# Patient Record
Sex: FEMALE | Employment: FULL TIME | URBAN - METROPOLITAN AREA
[De-identification: names, ages, dates, MRNs, and addresses within clinical notes are randomized per-mention and may not be internally consistent; named-entity substitution may affect disease eponyms.]

---

## 2018-12-22 ENCOUNTER — APPOINTMENT (OUTPATIENT)
Dept: CT IMAGING | Age: 43
DRG: 176 | End: 2018-12-22
Attending: EMERGENCY MEDICINE
Payer: COMMERCIAL

## 2018-12-22 ENCOUNTER — HOSPITAL ENCOUNTER (INPATIENT)
Age: 43
LOS: 3 days | Discharge: HOME | DRG: 176 | End: 2018-12-26
Attending: EMERGENCY MEDICINE | Admitting: INTERNAL MEDICINE
Payer: COMMERCIAL

## 2018-12-22 DIAGNOSIS — I26.99 BILATERAL PULMONARY EMBOLISM (HCC): Primary | ICD-10-CM

## 2018-12-22 LAB
APPEARANCE UR: CLEAR
BACTERIA URNS QL MICRO: ABNORMAL /HPF
BASOPHILS # BLD: 0 K/UL (ref 0–0.1)
BASOPHILS NFR BLD: 0 % (ref 0–1)
BILIRUB UR QL: NEGATIVE
COLOR UR: ABNORMAL
DIFFERENTIAL METHOD BLD: ABNORMAL
EOSINOPHIL # BLD: 0 K/UL (ref 0–0.4)
EOSINOPHIL NFR BLD: 0 % (ref 0–7)
EPITH CASTS URNS QL MICRO: ABNORMAL /LPF
ERYTHROCYTE [DISTWIDTH] IN BLOOD BY AUTOMATED COUNT: 14.1 % (ref 11.5–14.5)
FLUAV AG NPH QL IA: NEGATIVE
FLUBV AG NOSE QL IA: NEGATIVE
GLUCOSE UR STRIP.AUTO-MCNC: NEGATIVE MG/DL
HCT VFR BLD AUTO: 38 % (ref 35–47)
HGB BLD-MCNC: 12.9 G/DL (ref 11.5–16)
HGB UR QL STRIP: NEGATIVE
IMM GRANULOCYTES # BLD: 0 K/UL (ref 0–0.04)
IMM GRANULOCYTES NFR BLD AUTO: 0 % (ref 0–0.5)
KETONES UR QL STRIP.AUTO: ABNORMAL MG/DL
LACTATE SERPL-SCNC: 0.5 MMOL/L (ref 0.4–2)
LEUKOCYTE ESTERASE UR QL STRIP.AUTO: NEGATIVE
LYMPHOCYTES # BLD: 1.4 K/UL (ref 0.8–3.5)
LYMPHOCYTES NFR BLD: 7 % (ref 12–49)
MCH RBC QN AUTO: 32.4 PG (ref 26–34)
MCHC RBC AUTO-ENTMCNC: 33.9 G/DL (ref 30–36.5)
MCV RBC AUTO: 95.5 FL (ref 80–99)
MONOCYTES # BLD: 1.6 K/UL (ref 0–1)
MONOCYTES NFR BLD: 8 % (ref 5–13)
NEUTS BAND NFR BLD MANUAL: 1 %
NEUTS SEG # BLD: 17.6 K/UL (ref 1.8–8)
NEUTS SEG NFR BLD: 84 % (ref 32–75)
NITRITE UR QL STRIP.AUTO: NEGATIVE
NRBC # BLD: 0 K/UL (ref 0–0.01)
NRBC BLD-RTO: 0 PER 100 WBC
PH UR STRIP: 6 [PH] (ref 5–8)
PLATELET # BLD AUTO: 213 K/UL (ref 150–400)
PMV BLD AUTO: 11 FL (ref 8.9–12.9)
PROT UR STRIP-MCNC: NEGATIVE MG/DL
RBC # BLD AUTO: 3.98 M/UL (ref 3.8–5.2)
RBC #/AREA URNS HPF: ABNORMAL /HPF (ref 0–5)
RBC MORPH BLD: ABNORMAL
SP GR UR REFRACTOMETRY: 1.02 (ref 1–1.03)
UA: UC IF INDICATED,UAUC: ABNORMAL
UROBILINOGEN UR QL STRIP.AUTO: 1 EU/DL (ref 0.2–1)
WBC # BLD AUTO: 20.6 K/UL (ref 3.6–11)
WBC URNS QL MICRO: ABNORMAL /HPF (ref 0–4)

## 2018-12-22 PROCEDURE — 85025 COMPLETE CBC W/AUTO DIFF WBC: CPT

## 2018-12-22 PROCEDURE — 81001 URINALYSIS AUTO W/SCOPE: CPT

## 2018-12-22 PROCEDURE — 96361 HYDRATE IV INFUSION ADD-ON: CPT

## 2018-12-22 PROCEDURE — 80053 COMPREHEN METABOLIC PANEL: CPT

## 2018-12-22 PROCEDURE — 87804 INFLUENZA ASSAY W/OPTIC: CPT

## 2018-12-22 PROCEDURE — 94762 N-INVAS EAR/PLS OXIMTRY CONT: CPT

## 2018-12-22 PROCEDURE — 71275 CT ANGIOGRAPHY CHEST: CPT

## 2018-12-22 PROCEDURE — 74011250636 HC RX REV CODE- 250/636: Performed by: EMERGENCY MEDICINE

## 2018-12-22 PROCEDURE — 84484 ASSAY OF TROPONIN QUANT: CPT

## 2018-12-22 PROCEDURE — 99285 EMERGENCY DEPT VISIT HI MDM: CPT

## 2018-12-22 PROCEDURE — 36415 COLL VENOUS BLD VENIPUNCTURE: CPT

## 2018-12-22 PROCEDURE — 83605 ASSAY OF LACTIC ACID: CPT

## 2018-12-22 PROCEDURE — 96374 THER/PROPH/DIAG INJ IV PUSH: CPT

## 2018-12-22 PROCEDURE — 87086 URINE CULTURE/COLONY COUNT: CPT

## 2018-12-22 PROCEDURE — 83690 ASSAY OF LIPASE: CPT

## 2018-12-22 PROCEDURE — 87040 BLOOD CULTURE FOR BACTERIA: CPT

## 2018-12-22 PROCEDURE — 84443 ASSAY THYROID STIM HORMONE: CPT

## 2018-12-22 RX ORDER — KETOROLAC TROMETHAMINE 30 MG/ML
15 INJECTION, SOLUTION INTRAMUSCULAR; INTRAVENOUS
Status: COMPLETED | OUTPATIENT
Start: 2018-12-22 | End: 2018-12-22

## 2018-12-22 RX ADMIN — SODIUM CHLORIDE 1000 ML: 900 INJECTION, SOLUTION INTRAVENOUS at 23:11

## 2018-12-22 RX ADMIN — KETOROLAC TROMETHAMINE 15 MG: 30 INJECTION, SOLUTION INTRAMUSCULAR at 23:52

## 2018-12-23 ENCOUNTER — APPOINTMENT (OUTPATIENT)
Dept: ULTRASOUND IMAGING | Age: 43
DRG: 176 | End: 2018-12-23
Attending: INTERNAL MEDICINE
Payer: COMMERCIAL

## 2018-12-23 PROBLEM — I26.99 PE (PULMONARY THROMBOEMBOLISM) (HCC): Status: ACTIVE | Noted: 2018-12-23

## 2018-12-23 LAB
ALBUMIN SERPL-MCNC: 3.2 G/DL (ref 3.5–5)
ALBUMIN/GLOB SERPL: 0.8 {RATIO} (ref 1.1–2.2)
ALP SERPL-CCNC: 89 U/L (ref 45–117)
ALT SERPL-CCNC: 26 U/L (ref 12–78)
ANION GAP SERPL CALC-SCNC: 6 MMOL/L (ref 5–15)
AST SERPL-CCNC: 17 U/L (ref 15–37)
BILIRUB SERPL-MCNC: 0.6 MG/DL (ref 0.2–1)
BUN SERPL-MCNC: 8 MG/DL (ref 6–20)
BUN/CREAT SERPL: 13 (ref 12–20)
CALCIUM SERPL-MCNC: 8.2 MG/DL (ref 8.5–10.1)
CHLORIDE SERPL-SCNC: 105 MMOL/L (ref 97–108)
CO2 SERPL-SCNC: 26 MMOL/L (ref 21–32)
CREAT SERPL-MCNC: 0.63 MG/DL (ref 0.55–1.02)
GLOBULIN SER CALC-MCNC: 4.1 G/DL (ref 2–4)
GLUCOSE SERPL-MCNC: 99 MG/DL (ref 65–100)
HCG UR QL: NEGATIVE
LIPASE SERPL-CCNC: 66 U/L (ref 73–393)
POTASSIUM SERPL-SCNC: 3.7 MMOL/L (ref 3.5–5.1)
PROT SERPL-MCNC: 7.3 G/DL (ref 6.4–8.2)
SODIUM SERPL-SCNC: 137 MMOL/L (ref 136–145)
TROPONIN I SERPL-MCNC: <0.05 NG/ML
TROPONIN I SERPL-MCNC: <0.05 NG/ML
TSH SERPL DL<=0.05 MIU/L-ACNC: 1.02 UIU/ML (ref 0.36–3.74)

## 2018-12-23 PROCEDURE — 65660000000 HC RM CCU STEPDOWN

## 2018-12-23 PROCEDURE — 94640 AIRWAY INHALATION TREATMENT: CPT

## 2018-12-23 PROCEDURE — 94760 N-INVAS EAR/PLS OXIMETRY 1: CPT

## 2018-12-23 PROCEDURE — 74011250636 HC RX REV CODE- 250/636: Performed by: INTERNAL MEDICINE

## 2018-12-23 PROCEDURE — 74011000250 HC RX REV CODE- 250: Performed by: NURSE PRACTITIONER

## 2018-12-23 PROCEDURE — 77010033678 HC OXYGEN DAILY

## 2018-12-23 PROCEDURE — 36415 COLL VENOUS BLD VENIPUNCTURE: CPT

## 2018-12-23 PROCEDURE — 74011636320 HC RX REV CODE- 636/320: Performed by: EMERGENCY MEDICINE

## 2018-12-23 PROCEDURE — 93005 ELECTROCARDIOGRAM TRACING: CPT

## 2018-12-23 PROCEDURE — 77030029684 HC NEB SM VOL KT MONA -A

## 2018-12-23 PROCEDURE — 74011250636 HC RX REV CODE- 250/636: Performed by: EMERGENCY MEDICINE

## 2018-12-23 PROCEDURE — 74011250636 HC RX REV CODE- 250/636: Performed by: NURSE PRACTITIONER

## 2018-12-23 PROCEDURE — 74011250636 HC RX REV CODE- 250/636: Performed by: HOSPITALIST

## 2018-12-23 PROCEDURE — 74011636320 HC RX REV CODE- 636/320

## 2018-12-23 PROCEDURE — 93970 EXTREMITY STUDY: CPT

## 2018-12-23 PROCEDURE — 84145 PROCALCITONIN (PCT): CPT

## 2018-12-23 PROCEDURE — 81025 URINE PREGNANCY TEST: CPT

## 2018-12-23 PROCEDURE — 84484 ASSAY OF TROPONIN QUANT: CPT

## 2018-12-23 PROCEDURE — 74011250637 HC RX REV CODE- 250/637: Performed by: NURSE PRACTITIONER

## 2018-12-23 PROCEDURE — 74011000258 HC RX REV CODE- 258: Performed by: INTERNAL MEDICINE

## 2018-12-23 RX ORDER — DOCUSATE SODIUM 100 MG/1
100 CAPSULE, LIQUID FILLED ORAL
Status: DISCONTINUED | OUTPATIENT
Start: 2018-12-23 | End: 2018-12-26 | Stop reason: HOSPADM

## 2018-12-23 RX ORDER — TRAMADOL HYDROCHLORIDE 50 MG/1
50 TABLET ORAL
Status: DISCONTINUED | OUTPATIENT
Start: 2018-12-23 | End: 2018-12-26 | Stop reason: HOSPADM

## 2018-12-23 RX ORDER — MORPHINE SULFATE 4 MG/ML
1 INJECTION INTRAVENOUS ONCE
Status: COMPLETED | OUTPATIENT
Start: 2018-12-23 | End: 2018-12-23

## 2018-12-23 RX ORDER — SODIUM CHLORIDE 9 MG/ML
50 INJECTION, SOLUTION INTRAVENOUS
Status: COMPLETED | OUTPATIENT
Start: 2018-12-23 | End: 2018-12-23

## 2018-12-23 RX ORDER — GUAIFENESIN 600 MG/1
600 TABLET, EXTENDED RELEASE ORAL 2 TIMES DAILY
Status: DISCONTINUED | OUTPATIENT
Start: 2018-12-23 | End: 2018-12-26 | Stop reason: HOSPADM

## 2018-12-23 RX ORDER — POLYETHYLENE GLYCOL 3350 17 G/17G
17 POWDER, FOR SOLUTION ORAL DAILY
Status: DISCONTINUED | OUTPATIENT
Start: 2018-12-24 | End: 2018-12-26 | Stop reason: HOSPADM

## 2018-12-23 RX ORDER — KETOROLAC TROMETHAMINE 30 MG/ML
15 INJECTION, SOLUTION INTRAMUSCULAR; INTRAVENOUS
Status: COMPLETED | OUTPATIENT
Start: 2018-12-23 | End: 2018-12-24

## 2018-12-23 RX ORDER — FLUTICASONE FUROATE AND VILANTEROL 100; 25 UG/1; UG/1
1 POWDER RESPIRATORY (INHALATION) DAILY
Status: DISCONTINUED | OUTPATIENT
Start: 2018-12-23 | End: 2018-12-26 | Stop reason: HOSPADM

## 2018-12-23 RX ORDER — MORPHINE SULFATE 2 MG/ML
1 INJECTION, SOLUTION INTRAMUSCULAR; INTRAVENOUS ONCE
Status: COMPLETED | OUTPATIENT
Start: 2018-12-23 | End: 2018-12-23

## 2018-12-23 RX ORDER — MORPHINE SULFATE 2 MG/ML
1 INJECTION, SOLUTION INTRAMUSCULAR; INTRAVENOUS
Status: DISCONTINUED | OUTPATIENT
Start: 2018-12-23 | End: 2018-12-24

## 2018-12-23 RX ORDER — FACIAL-BODY WIPES
10 EACH TOPICAL DAILY PRN
Status: DISCONTINUED | OUTPATIENT
Start: 2018-12-23 | End: 2018-12-26 | Stop reason: HOSPADM

## 2018-12-23 RX ORDER — IPRATROPIUM BROMIDE AND ALBUTEROL SULFATE 2.5; .5 MG/3ML; MG/3ML
3 SOLUTION RESPIRATORY (INHALATION)
Status: DISCONTINUED | OUTPATIENT
Start: 2018-12-23 | End: 2018-12-25

## 2018-12-23 RX ORDER — OXYCODONE AND ACETAMINOPHEN 5; 325 MG/1; MG/1
1 TABLET ORAL
Status: DISCONTINUED | OUTPATIENT
Start: 2018-12-23 | End: 2018-12-26 | Stop reason: HOSPADM

## 2018-12-23 RX ORDER — ACETAMINOPHEN 325 MG/1
650 TABLET ORAL
Status: DISCONTINUED | OUTPATIENT
Start: 2018-12-23 | End: 2018-12-26 | Stop reason: HOSPADM

## 2018-12-23 RX ORDER — BENZONATATE 100 MG/1
100 CAPSULE ORAL
Status: DISCONTINUED | OUTPATIENT
Start: 2018-12-23 | End: 2018-12-26 | Stop reason: HOSPADM

## 2018-12-23 RX ORDER — BISACODYL 5 MG
5 TABLET, DELAYED RELEASE (ENTERIC COATED) ORAL DAILY PRN
Status: DISCONTINUED | OUTPATIENT
Start: 2018-12-23 | End: 2018-12-26 | Stop reason: HOSPADM

## 2018-12-23 RX ORDER — ENOXAPARIN SODIUM 100 MG/ML
1 INJECTION SUBCUTANEOUS EVERY 12 HOURS
Status: DISCONTINUED | OUTPATIENT
Start: 2018-12-23 | End: 2018-12-24

## 2018-12-23 RX ORDER — ENOXAPARIN SODIUM 100 MG/ML
1 INJECTION SUBCUTANEOUS
Status: COMPLETED | OUTPATIENT
Start: 2018-12-23 | End: 2018-12-23

## 2018-12-23 RX ORDER — SODIUM CHLORIDE 0.9 % (FLUSH) 0.9 %
10 SYRINGE (ML) INJECTION
Status: COMPLETED | OUTPATIENT
Start: 2018-12-23 | End: 2018-12-23

## 2018-12-23 RX ORDER — LEVOFLOXACIN 5 MG/ML
750 INJECTION, SOLUTION INTRAVENOUS
Status: COMPLETED | OUTPATIENT
Start: 2018-12-23 | End: 2018-12-23

## 2018-12-23 RX ADMIN — MORPHINE SULFATE 1 MG: 2 INJECTION, SOLUTION INTRAMUSCULAR; INTRAVENOUS at 10:47

## 2018-12-23 RX ADMIN — CEFTRIAXONE 1 G: 1 INJECTION, POWDER, FOR SOLUTION INTRAMUSCULAR; INTRAVENOUS at 05:16

## 2018-12-23 RX ADMIN — AZITHROMYCIN MONOHYDRATE 500 MG: 500 INJECTION, POWDER, LYOPHILIZED, FOR SOLUTION INTRAVENOUS at 06:16

## 2018-12-23 RX ADMIN — MORPHINE SULFATE 1 MG: 2 INJECTION, SOLUTION INTRAMUSCULAR; INTRAVENOUS at 17:39

## 2018-12-23 RX ADMIN — IPRATROPIUM BROMIDE AND ALBUTEROL SULFATE 3 ML: .5; 3 SOLUTION RESPIRATORY (INHALATION) at 10:13

## 2018-12-23 RX ADMIN — TRAMADOL HYDROCHLORIDE 50 MG: 50 TABLET, FILM COATED ORAL at 08:29

## 2018-12-23 RX ADMIN — OXYCODONE AND ACETAMINOPHEN 1 TABLET: 5; 325 TABLET ORAL at 09:09

## 2018-12-23 RX ADMIN — MORPHINE SULFATE 1 MG: 2 INJECTION, SOLUTION INTRAMUSCULAR; INTRAVENOUS at 13:45

## 2018-12-23 RX ADMIN — ENOXAPARIN SODIUM 90 MG: 100 INJECTION, SOLUTION INTRAVENOUS; SUBCUTANEOUS at 17:38

## 2018-12-23 RX ADMIN — GUAIFENESIN 600 MG: 600 TABLET, EXTENDED RELEASE ORAL at 13:45

## 2018-12-23 RX ADMIN — FLUTICASONE FUROATE AND VILANTEROL TRIFENATATE 1 PUFF: 100; 25 POWDER RESPIRATORY (INHALATION) at 13:45

## 2018-12-23 RX ADMIN — ENOXAPARIN SODIUM 90 MG: 100 INJECTION, SOLUTION INTRAVENOUS; SUBCUTANEOUS at 03:28

## 2018-12-23 RX ADMIN — IPRATROPIUM BROMIDE AND ALBUTEROL SULFATE 3 ML: .5; 3 SOLUTION RESPIRATORY (INHALATION) at 14:09

## 2018-12-23 RX ADMIN — Medication 10 ML: at 01:00

## 2018-12-23 RX ADMIN — MORPHINE SULFATE 1 MG: 2 INJECTION, SOLUTION INTRAMUSCULAR; INTRAVENOUS at 21:00

## 2018-12-23 RX ADMIN — MORPHINE SULFATE 1 MG: 4 INJECTION INTRAVENOUS at 05:26

## 2018-12-23 RX ADMIN — GUAIFENESIN 600 MG: 600 TABLET, EXTENDED RELEASE ORAL at 17:38

## 2018-12-23 RX ADMIN — OXYCODONE AND ACETAMINOPHEN 1 TABLET: 5; 325 TABLET ORAL at 21:51

## 2018-12-23 RX ADMIN — TRAMADOL HYDROCHLORIDE 50 MG: 50 TABLET, FILM COATED ORAL at 20:19

## 2018-12-23 RX ADMIN — LEVOFLOXACIN 750 MG: 5 INJECTION, SOLUTION INTRAVENOUS at 03:29

## 2018-12-23 RX ADMIN — OXYCODONE AND ACETAMINOPHEN 1 TABLET: 5; 325 TABLET ORAL at 15:35

## 2018-12-23 RX ADMIN — IOPAMIDOL: 755 INJECTION, SOLUTION INTRAVENOUS at 01:40

## 2018-12-23 RX ADMIN — IOPAMIDOL 100 ML: 755 INJECTION, SOLUTION INTRAVENOUS at 01:00

## 2018-12-23 RX ADMIN — SODIUM CHLORIDE 50 ML/HR: 900 INJECTION, SOLUTION INTRAVENOUS at 01:00

## 2018-12-23 RX ADMIN — IPRATROPIUM BROMIDE AND ALBUTEROL SULFATE 3 ML: .5; 3 SOLUTION RESPIRATORY (INHALATION) at 20:03

## 2018-12-23 NOTE — ED PROVIDER NOTES
EMERGENCY DEPARTMENT HISTORY AND PHYSICAL EXAM    Date: 12/22/2018  Patient Name: Tom Jin    History of Presenting Illness     Chief Complaint   Patient presents with    Chest Pain     ambulatory to triage, states that she had been sick for 3 days. Diagnosed with pneumonia yesterday and she started on medications. Was seen in the ED this morning for pains in her chest and right flank area, was prescribed pain meds but not getting any better    Flank Pain       History Provided By: Patient    HPI: Tom Jin is a 39 y.o. female, who presents ambulatory accompanied with her Mother to the ED c/o progressively worsening tight chest and right flank pain x yesterday. Pt states for the past x3 days she has had \"flu like symptoms,\" including intermittent fevers, generalized body aches and a decreased appetite. She states was evaluated yesterday at an ED in [place] where she currently lives, where she was diagnosed with pneumonia after blood gas labs and chest x-ray. Pt states she was placed on azithromycin, but has had no relief. She reports being seen again this morning for persistent pain, noting she had a negative UA and prescribed Tramadol. Pt states her pain has continued, stating it is exacerbated with movement. She also reports mild shortness of breath secondary to her pain. Of note, she had her flu shot x1.5 weeks ago. Pt specifically denies any congestion, cough, leg pain, leg swelling, abdominal pain, nausea, vomiting, diarrhea, dysuria, or urinary frequency. PCP: Angie Reid MD    PMHx: Significant for none  PSHx: Significant for none  Social Hx: -tobacco, -EtOH, -Illicit Drugs     There are no other complaints, changes, or physical findings at this time.      Current Facility-Administered Medications   Medication Dose Route Frequency Provider Last Rate Last Dose    levoFLOXacin (LEVAQUIN) 750 mg in D5W IVPB  750 mg IntraVENous NOW Jonn Alexandra MD        enoxaparin (LOVENOX) injection 90 mg  1 mg/kg SubCUTAneous NOW Toma Cook MD        sodium chloride 0.9 % bolus infusion 1,000 mL  1,000 mL IntraVENous NOW Jed Hoffman MD           Past History     Past Medical History:  No past medical history on file. Past Surgical History:  No past surgical history on file. Family History:  No family history on file. Social History:  Social History     Tobacco Use    Smoking status: Not on file   Substance Use Topics    Alcohol use: Not on file    Drug use: Not on file       Allergies: Allergies   Allergen Reactions    Sulfa (Sulfonamide Antibiotics) Other (comments)     Vomiting           Review of Systems   Review of Systems   Constitutional: Positive for appetite change and fever. Eyes: Negative. Respiratory: Positive for shortness of breath. Cardiovascular: Positive for chest pain. Gastrointestinal: Negative for abdominal pain, nausea and vomiting. Endocrine: Negative. Genitourinary: Positive for flank pain. Negative for difficulty urinating, dysuria and hematuria. Musculoskeletal: Positive for myalgias. Skin: Negative. Allergic/Immunologic: Negative. Neurological: Negative. Psychiatric/Behavioral: Negative for suicidal ideas. All other systems reviewed and are negative.       Physical Exam   Physical Exam      Diagnostic Study Results     Labs -     Recent Results (from the past 12 hour(s))   URINALYSIS W/ REFLEX CULTURE    Collection Time: 12/22/18  9:19 PM   Result Value Ref Range    Color YELLOW/STRAW      Appearance CLEAR CLEAR      Specific gravity 1.024 1.003 - 1.030      pH (UA) 6.0 5.0 - 8.0      Protein NEGATIVE  NEG mg/dL    Glucose NEGATIVE  NEG mg/dL    Ketone TRACE (A) NEG mg/dL    Bilirubin NEGATIVE  NEG      Blood NEGATIVE  NEG      Urobilinogen 1.0 0.2 - 1.0 EU/dL    Nitrites NEGATIVE  NEG      Leukocyte Esterase NEGATIVE  NEG      WBC 0-4 0 - 4 /hpf    RBC 0-5 0 - 5 /hpf    Epithelial cells FEW FEW /lpf    Bacteria 1+ (A) NEG /hpf    UA:UC IF INDICATED URINE CULTURE ORDERED (A) CNI     INFLUENZA A & B AG (RAPID TEST)    Collection Time: 12/22/18 10:43 PM   Result Value Ref Range    Influenza A Antigen NEGATIVE  NEG      Influenza B Antigen NEGATIVE  NEG     LACTIC ACID    Collection Time: 12/22/18 10:47 PM   Result Value Ref Range    Lactic acid 0.5 0.4 - 2.0 MMOL/L   CBC WITH AUTOMATED DIFF    Collection Time: 12/22/18 10:50 PM   Result Value Ref Range    WBC 20.6 (H) 3.6 - 11.0 K/uL    RBC 3.98 3.80 - 5.20 M/uL    HGB 12.9 11.5 - 16.0 g/dL    HCT 38.0 35.0 - 47.0 %    MCV 95.5 80.0 - 99.0 FL    MCH 32.4 26.0 - 34.0 PG    MCHC 33.9 30.0 - 36.5 g/dL    RDW 14.1 11.5 - 14.5 %    PLATELET 603 064 - 775 K/uL    MPV 11.0 8.9 - 12.9 FL    NRBC 0.0 0  WBC    ABSOLUTE NRBC 0.00 0.00 - 0.01 K/uL    NEUTROPHILS 84 (H) 32 - 75 %    BAND NEUTROPHILS 1 %    LYMPHOCYTES 7 (L) 12 - 49 %    MONOCYTES 8 5 - 13 %    EOSINOPHILS 0 0 - 7 %    BASOPHILS 0 0 - 1 %    IMMATURE GRANULOCYTES 0 0.0 - 0.5 %    ABS. NEUTROPHILS 17.6 (H) 1.8 - 8.0 K/UL    ABS. LYMPHOCYTES 1.4 0.8 - 3.5 K/UL    ABS. MONOCYTES 1.6 (H) 0.0 - 1.0 K/UL    ABS. EOSINOPHILS 0.0 0.0 - 0.4 K/UL    ABS. BASOPHILS 0.0 0.0 - 0.1 K/UL    ABS. IMM. GRANS. 0.0 0.00 - 0.04 K/UL    DF MANUAL      RBC COMMENTS NORMOCYTIC, NORMOCHROMIC     METABOLIC PANEL, COMPREHENSIVE    Collection Time: 12/22/18 10:50 PM   Result Value Ref Range    Sodium 137 136 - 145 mmol/L    Potassium 3.7 3.5 - 5.1 mmol/L    Chloride 105 97 - 108 mmol/L    CO2 26 21 - 32 mmol/L    Anion gap 6 5 - 15 mmol/L    Glucose 99 65 - 100 mg/dL    BUN 8 6 - 20 MG/DL    Creatinine 0.63 0.55 - 1.02 MG/DL    BUN/Creatinine ratio 13 12 - 20      GFR est AA >60 >60 ml/min/1.73m2    GFR est non-AA >60 >60 ml/min/1.73m2    Calcium 8.2 (L) 8.5 - 10.1 MG/DL    Bilirubin, total 0.6 0.2 - 1.0 MG/DL    ALT (SGPT) 26 12 - 78 U/L    AST (SGOT) 17 15 - 37 U/L    Alk.  phosphatase 89 45 - 117 U/L    Protein, total 7.3 6.4 - 8.2 g/dL    Albumin 3.2 (L) 3.5 - 5.0 g/dL    Globulin 4.1 (H) 2.0 - 4.0 g/dL    A-G Ratio 0.8 (L) 1.1 - 2.2     TROPONIN I    Collection Time: 12/22/18 10:50 PM   Result Value Ref Range    Troponin-I, Qt. <0.05 <0.05 ng/mL   LIPASE    Collection Time: 12/22/18 10:50 PM   Result Value Ref Range    Lipase 66 (L) 73 - 393 U/L   TSH 3RD GENERATION    Collection Time: 12/22/18 10:50 PM   Result Value Ref Range    TSH 1.02 0.36 - 3.74 uIU/mL   HCG URINE, QL. - POC    Collection Time: 12/23/18 12:46 AM   Result Value Ref Range    Pregnancy test,urine (POC) NEGATIVE  NEG         Radiologic Studies -   CTA CHEST W OR W WO CONT   Final Result   IMPRESSION:   1. Bilateral pulmonary emboli. 2. Right lower lobe consolidation with right pleural effusion. 3. Minimal airspace disease left lower lobe. CT Results  (Last 48 hours)               12/23/18 0200  CTA CHEST W OR W WO CONT Final result    Impression:  IMPRESSION:   1. Bilateral pulmonary emboli. 2. Right lower lobe consolidation with right pleural effusion. 3. Minimal airspace disease left lower lobe. Narrative:  History: Chest pain. CTA of the chest was performed. 100 mL Isovue-370 were injected and scanning   was performed during the arterial phase of contrast administration. Post   processing was performed. 3D reconstructions were performed. CT dose reduction   was achieved through use of a standardized protocol tailored for this   examination and automatic exposure control for dose modulation. There are filling defects within pulmonary arteries in the left lower lobe,   right lower lobe, right upper lobe, and right middle lobe. There is a right   pleural effusion. There is minimal airspace disease in the left lower lobe and   there is dense consolidation in the right lower lobe. The heart, pericardium,   and great vessels appear unremarkable. The chest wall and axilla appear   unremarkable.                 CXR Results  (Last 48 hours)    None Medical Decision Making   I am the first provider for this patient. I reviewed the vital signs, available nursing notes, past medical history, past surgical history, family history and social history. Vital Signs-Reviewed the patient's vital signs. Patient Vitals for the past 12 hrs:   Temp Pulse Resp BP SpO2   12/23/18 0215 -- 79 18 120/70 93 %   12/23/18 0200 -- 83 16 125/66 93 %   12/23/18 0152 -- -- -- 131/67 --   12/23/18 0030 -- 84 21 119/70 94 %   12/23/18 0015 -- 87 17 121/74 94 %   12/23/18 0001 -- 86 27 134/77 94 %   12/22/18 2345 -- 89 18 124/73 94 %   12/22/18 2330 -- 87 19 121/75 93 %   12/22/18 2315 -- 91 21 131/76 93 %   12/22/18 2300 -- 90 21 130/76 93 %   12/22/18 2245 -- 93 19 119/71 94 %   12/22/18 2233 -- 98 19 134/79 93 %   12/22/18 2224 -- -- -- 133/71 95 %   12/22/18 2105 98.5 °F (36.9 °C) (!) 109 24 (!) 134/104 100 %       Pulse Oximetry Analysis - 95% on RA    Cardiac Monitor:   Rate: 90 bpm  Rhythm: Normal Sinus Rhythm      Records Reviewed: Nursing Notes and Old Medical Records    Provider Notes (Medical Decision Making):     DDX:  Pna, pe, pleural effusion, gall bladder disease, uti, stone, pyelo    Plan:  Labs, ua, cta, analgesic    Impression:  Bilateral PE's    ED Course:   Initial assessment performed. The patients presenting problems have been discussed, and they are in agreement with the care plan formulated and outlined with them. I have encouraged them to ask questions as they arise throughout their visit. I reviewed our electronic medical record system for any past medical records that were available that may contribute to the patients current condition, the nursing notes and and vital signs from today's visit    Nursing notes will be reviewed as they become available in realtime while the pt has been in the ED. Belle Casillas MD    I personally reviewed pt's imaging. Official read by radiology listed above.   Belle Casillas MD         PROGRESS NOTE:  2:25 AM  Pt reevaluated. Pt noted to have bilateral PEs on chest cta. Will dose with Lovenox and start iv Levaquin. Written by Chante Troncoso, ED Scribe, as dictated by Abisai Andres MD    PROGRESS NOTE  2:30 AM  Pt reevaluated. Pt updated on resulted chest cta and current plan of care, including admission. Pt expressed understanding of current plan. Written by Chante Troncoso, ED Scribe, as dictated by Abisai Andres MD.     CONSULT NOTE:   2:31 AM  Abisai Andres MD spoke with Dr. Cayla Mclean,   Specialty: Sandy Mclean due to bilateral pe on chest cta. Discussed pt's HPI and available diagnostic results thus far. Expressed concerns for needed admission. Consultant will admit. Abisai Andres MD      Critical Care Time:     none    PLAN:  1. Admission to the hospitalist     Disposition:    Admit Note:  2:33 AM  Pt is being admitted by Dr. Cayla Mclean. The results of their tests and reason(s) for their admission have been discussed with pt and/or available family. They convey agreement and understanding for the need to be admitted and for admission diagnosis. Diagnosis     Clinical Impression:   1. Bilateral pulmonary embolism ([address]        Attestations: This note is prepared by Chante Troncoso, acting as Scribe for MD Abisai Olsen MD : The scribe's documentation has been prepared under my direction and personally reviewed by me in its entirety. I confirm that the note above accurately reflects all work, treatment, procedures, and medical decision making performed by me. This note will not be viewable in [address]

## 2018-12-23 NOTE — PROGRESS NOTES
TRANSFER - IN REPORT:    Verbal report received from [place] [id number] [facility] on [place]  being received from ER for routine progression of care      Report consisted of patients Situation, Background, Assessment and   Recommendations(SBAR). Information from the following report(s) SBAR was reviewed with the receiving nurse. Opportunity for questions and clarification was provided. Assessment completed upon patients arrival to unit and care assumed.      Primary Nurse Piyush Room, RN and Maribel Jaquez RN performed a dual skin assessment on this patient No impairment noted  Rickie score is 23

## 2018-12-23 NOTE — PROGRESS NOTES
Pharmacy - Enoxaparin (Lovenox®) Monitoring      Indication: PE     Current Dose: Enoxaparin 90 mg subcutaneously every 12 hours    Creatinine Clearance (mL/min): >30    Current Weight: 86 Kg    Labs:  Recent Labs     12/22/18  2250   CREA 0.63   HGB 12.9        Wt Readings from Last 1 Encounters:   12/22/18 86.4 kg (190 lb 7.6 oz)     Ht Readings from Last 1 Encounters:   12/22/18 167.6 cm (66\")       Impression/Plan:   · Enoxaparin 90 mg BID is appropriate dose        ThanksJerry, PHARMD      [email or web address]%[email or web address]. pdf

## 2018-12-23 NOTE — PROGRESS NOTES
Hospitalist Progress Note    NAME: Darek Dunham   :  1975   MRN:  [id number]       Interim Hospital Summary: 39 y.o. female whom presented on 2018 with      Assessment / Plan:  Sepsis (tachycardia, leukocytosis, SOB, and chest pain) POA  Bilateral acute PE POA  CAP POA  Tobacco abuse (20+ yrs of smoking hx)  - continue care at telemetry unit  - CTA chest:     1. Bilateral pulmonary emboli. 2. Right lower lobe consolidation with right pleural effusion. 3. Minimal airspace disease left lower lobe. - troponin (-) x 2, no chest pain at this time  - do not see any 12 lead Echocardiogram in the system. Telemetry revealed NSR at this time. We will get 12 lead EKG to make sure there's no abnormalities  - Venous Doppler lower extremities; to be done  - currently on therapeutic dose of Lovenox; asked CM to check on price for Eliquis  - dx with PNA about one week ago, just finished one week course of ABX. Continue with azithromycin and rocephin  - U/A (-)  - started on Breo, mucinex, and scheduled duoneb  - discussed about importance of smoking cessation which pt agreed. Nicotine patch for now  - procal level sent by admitting physician  - blood and urine cx pending  - sputum cx if she starts to have productive cough  - pulmonary toilet; incentive spirometry. Wean O2 as long as O2 sat is greater than or equal to 93%  - pain medication PRN  - Echo to be done  - hematology consult; only risk factor smoking. Last used hormonal therapy about 15 yrs ago    Obese  - pt aware importance of weight loss through life style modification     Code Status: full code   Surrogate Decision Maker:mother      DVT Prophylaxis: Lovenox   GI Prophylaxis: not indicated     Baseline: independent     Disposition: home with family    Subjective:     Chief Complaint / Reason for Physician Visit  \"my chest hurts when I cough or move\". Discussed with RN events overnight.      Review of Systems:  Symptom Y/N Comments Symptom Y/N Comments   Fever/Chills n   Chest Pain n    Poor Appetite    Edema     Cough y   Abdominal Pain     Sputum n   Joint Pain     SOB/LARSEN y   Pruritis/Rash     Nausea/vomit n   Tolerating PT/OT     Diarrhea n   Tolerating Diet     Constipation n   Other       Could NOT obtain due to:      Objective:     VITALS:   Last 24hrs VS reviewed since prior progress note. Most recent are:  Patient Vitals for the past 24 hrs:   Temp Pulse Resp BP SpO2   12/23/18 1046 -- -- -- -- 94 %   12/23/18 1014 -- -- -- -- (!) 89 %   12/23/18 0722 98.6 °F (37 °C) 94 18 125/75 93 %   12/23/18 0448 98.3 °F (36.8 °C) 78 18 130/84 100 %   12/23/18 0402 98.1 °F (36.7 °C) 84 16 133/72 94 %   12/23/18 0401 98.1 °F (36.7 °C) -- -- -- --   12/23/18 0215 -- 79 18 120/70 93 %   12/23/18 0200 -- 83 16 125/66 93 %   12/23/18 0152 -- -- -- 131/67 --   12/23/18 0030 -- 84 21 119/70 94 %   12/23/18 0015 -- 87 17 121/74 94 %   12/23/18 0001 -- 86 27 134/77 94 %   12/22/18 2345 -- 89 18 124/73 94 %   12/22/18 2330 -- 87 19 121/75 93 %   12/22/18 2315 -- 91 21 131/76 93 %   12/22/18 2300 -- 90 21 130/76 93 %   12/22/18 2245 -- 93 19 119/71 94 %   12/22/18 2233 -- 98 19 134/79 93 %   12/22/18 2224 -- -- -- 133/71 95 %   12/22/18 2105 98.5 °F (36.9 °C) (!) 109 24 (!) 134/104 100 %       Intake/Output Summary (Last 24 hours) at 12/23/2018 1115  Last data filed at 12/23/2018 0900  Gross per 24 hour   Intake 140 ml   Output --   Net 140 ml        PHYSICAL EXAM:  General: WD, WN. Alert, cooperative, no acute distress    EENT:  EOMI. Anicteric sclerae. MMM  Resp:  Clear in apex with diminished breath sounds at bases, no wheezing or rales. No accessory muscle use  CV:  Regular  rhythm,  No edema  GI:  Soft, Non distended, Non tender.  +Bowel sounds  Neurologic:  Alert and oriented X 3, normal speech,   Psych:   Good insight. Not anxious nor agitated  Skin:  No rashes.   No jaundice    Reviewed most current lab test results and cultures  YES  Reviewed most current radiology test results   YES  Review and summation of old records today    NO  Reviewed patient's current orders and MAR    YES  PMH/SH reviewed - no change compared to H&P  ________________________________________________________________________  Care Plan discussed with:    Comments   Patient y    Family      RN y    Care Manager     Consultant                        Multidiciplinary team rounds were held today with , nursing, pharmacist and clinical coordinator. Patient's plan of care was discussed; medications were reviewed and discharge planning was addressed. ________________________________________________________________________  Total NON critical care TIME:  30  Minutes    Total CRITICAL CARE TIME Spent:   Minutes non procedure based      Comments   >50% of visit spent in counseling and coordination of care     ________________________________________________________________________  Roxana Farrell NP     Procedures: see electronic medical records for all procedures/Xrays and details which were not copied into this note but were reviewed prior to creation of Plan. LABS:  I reviewed today's most current labs and imaging studies.   Pertinent labs include:  Recent Labs     12/22/18  2250   WBC 20.6*   HGB 12.9   HCT 38.0        Recent Labs     12/22/18  2250      K 3.7      CO2 26   GLU 99   BUN 8   CREA 0.63   CA 8.2*   ALB 3.2*   TBILI 0.6   SGOT 17   ALT 26       Signed: )Silvino Ceron NP

## 2018-12-23 NOTE — ED NOTES
TRANSFER - OUT REPORT:    Verbal report given to RN(name) on [address]  being transferred to [facility](unit) for routine progression of care       Report consisted of patients Situation, Background, Assessment and   Recommendations(SBAR). Information from the following report(s) SBAR and ED Summary was reviewed with the receiving nurse. Lines:   Peripheral IV 12/22/18 Left Antecubital (Active)   Site Assessment Clean, dry, & intact 12/22/2018 10:44 PM   Phlebitis Assessment 0 12/22/2018 10:44 PM   Infiltration Assessment 0 12/22/2018 10:44 PM   Dressing Status Clean, dry, & intact 12/22/2018 10:44 PM   Dressing Type Transparent 12/22/2018 10:44 PM        Opportunity for questions and clarification was provided.       Patient transported with:   [employer]

## 2018-12-23 NOTE — H&P
Hospitalist Admission Note    NAME: Natalia Yepez   :  1975   MRN:  [id number]     Date/Time:  2018 6:15 AM    Patient PCP: Angie Reid MD  ______________________________________________________________________  Given the patient's current clinical presentation, I have a high level of concern for decompensation if discharged from the emergency department. Complex decision making was performed, which includes reviewing the patient's available past medical records, laboratory results, and x-ray films. My assessment of this patient's clinical condition and my plan of care is as follows. Assessment / Plan:    Bilateral acute PE  -Admit patient to telemetry unit to-start patient on full dose Lovenox  -Follow-up serial troponin  -Echocardiogram  -Venous Doppler lower    Sepsis secondary to community-acquired pneumonia  -Follow-up pro calcitonin level  -Follow-up blood culture and sputum culture  -Start patient on broad-spectrum antibiotic Rocephin and azithromycin      Code Status: full code   Surrogate Decision Maker:mother     DVT Prophylaxis: Lovenox   GI Prophylaxis: not indicated    Baseline: independent       Subjective:   CHIEF COMPLAINT: chest pain   39years old female from home with no significant past medical history presented to the hospital complaining from chest pain associated with the flank pain started yesterday associated with shortness of breath and generalized weakness, patient stated she was diagnosed with pneumonia about 1 week ago and she was discharged with oral antibiotic, according to the patient her symptom did not improve and her condition worsening, patient had CTA chest was done and show bilateral PE and right lower lobe consolidation with right pleural effusion and minimal airspace disease in the lower lobe, blood work was significant for elevated white blood cell count 20.6.   HISTORY OF PRESENT ILLNESS:       We were asked to admit for work up and evaluation of the above problems. No past medical history on file. No past surgical history on file. Social History     Tobacco Use    Smoking status: Not on file   Substance Use Topics    Alcohol use: Not on file        No family history on file. Allergies   Allergen Reactions    Sulfa (Sulfonamide Antibiotics) Other (comments)     Vomiting          Prior to Admission medications    Not on File       REVIEW OF SYSTEMS:     I am not able to complete the review of systems because:    The patient is intubated and sedated    The patient has altered mental status due to his acute medical problems    The patient has baseline aphasia from prior stroke(s)    The patient has baseline dementia and is not reliable historian    The patient is in acute medical distress and unable to provide information           Total of 12 systems reviewed as follows:       POSITIVE= underlined text  Negative = text not underlined  General:  fever, chills, sweats, generalized weakness, weight loss/gain,      loss of appetite   Eyes:    blurred vision, eye pain, loss of vision, double vision  ENT:    rhinorrhea, pharyngitis   Respiratory:   cough, sputum production, SOB, LARSEN, wheezing, pleuritic pain   Cardiology:   chest pain, palpitations, orthopnea, PND, edema, syncope   Gastrointestinal:  abdominal pain , N/V, diarrhea, dysphagia, constipation, bleeding   Genitourinary:  frequency, urgency, dysuria, hematuria, incontinence   Muskuloskeletal :  arthralgia, myalgia, back pain  Hematology:  easy bruising, nose or gum bleeding, lymphadenopathy   Dermatological: rash, ulceration, pruritis, color change / jaundice  Endocrine:   hot flashes or polydipsia   Neurological:  headache, dizziness, confusion, focal weakness, paresthesia,     Speech difficulties, memory loss, gait difficulty  Psychological: Feelings of anxiety, depression, agitation    Objective:   VITALS:    Visit Vitals  /84   Pulse 78   Temp 98.3 °F (36.8 °C)   Resp 18   Ht 5' 6\" (1.676 m)   Wt 86.4 kg (190 lb 7.6 oz)   SpO2 100%   Breastfeeding? No   BMI 30.74 kg/m²       PHYSICAL EXAM:    General:    Alert, cooperative, no distress, appears stated age. HEENT: Atraumatic, anicteric sclerae, pink conjunctivae     No oral ulcers, mucosa moist, throat clear, dentition fair  Neck:  Supple, symmetrical,  thyroid: non tender  Lungs:   Clear to auscultation bilaterally. No Wheezing or Rhonchi. No rales. Chest wall:  No tenderness  No Accessory muscle use. Heart:   Regular  rhythm,  No  murmur   No edema  Abdomen:   Soft, non-tender. Not distended. Bowel sounds normal  Extremities: No cyanosis. No clubbing,      Skin turgor normal, Capillary refill normal, Radial dial pulse 2+  Skin:     Not pale. Not Jaundiced  No rashes   Psych:  Good insight. Not depressed. Not anxious or agitated. Neurologic: EOMs intact. No facial asymmetry. No aphasia or slurred speech. Symmetrical strength, Sensation grossly intact. Alert and oriented X 4.     _______________________________________________________________________  Care Plan discussed with:    Comments   Patient y    Family  y    RN     Care Manager                    Consultant:      _______________________________________________________________________  Expected  Disposition:   Home with Family y   HH/PT/OT/RN    SNF/LTC    BON    ________________________________________________________________________  TOTAL TIME:  72  Minutes    Critical Care Provided     Minutes non procedure based      Comments    y Reviewed previous records   >50% of visit spent in counseling and coordination of care y Discussion with patient and/or family and questions answered       ________________________________________________________________________  Signed: Garfield Crane MD    Procedures: see electronic medical records for all procedures/Xrays and details which were not copied into this note but were reviewed prior to creation of Plan.     LAB DATA REVIEWED:    Recent Results (from the past 24 hour(s))   URINALYSIS W/ REFLEX CULTURE    Collection Time: 12/22/18  9:19 PM   Result Value Ref Range    Color YELLOW/STRAW      Appearance CLEAR CLEAR      Specific gravity 1.024 1.003 - 1.030      pH (UA) 6.0 5.0 - 8.0      Protein NEGATIVE  NEG mg/dL    Glucose NEGATIVE  NEG mg/dL    Ketone TRACE (A) NEG mg/dL    Bilirubin NEGATIVE  NEG      Blood NEGATIVE  NEG      Urobilinogen 1.0 0.2 - 1.0 EU/dL    Nitrites NEGATIVE  NEG      Leukocyte Esterase NEGATIVE  NEG      WBC 0-4 0 - 4 /hpf    RBC 0-5 0 - 5 /hpf    Epithelial cells FEW FEW /lpf    Bacteria 1+ (A) NEG /hpf    UA:UC IF INDICATED URINE CULTURE ORDERED (A) CNI     INFLUENZA A & B AG (RAPID TEST)    Collection Time: 12/22/18 10:43 PM   Result Value Ref Range    Influenza A Antigen NEGATIVE  NEG      Influenza B Antigen NEGATIVE  NEG     LACTIC ACID    Collection Time: 12/22/18 10:47 PM   Result Value Ref Range    Lactic acid 0.5 0.4 - 2.0 MMOL/L   CBC WITH AUTOMATED DIFF    Collection Time: 12/22/18 10:50 PM   Result Value Ref Range    WBC 20.6 (H) 3.6 - 11.0 K/uL    RBC 3.98 3.80 - 5.20 M/uL    HGB 12.9 11.5 - 16.0 g/dL    HCT 38.0 35.0 - 47.0 %    MCV 95.5 80.0 - 99.0 FL    MCH 32.4 26.0 - 34.0 PG    MCHC 33.9 30.0 - 36.5 g/dL    RDW 14.1 11.5 - 14.5 %    PLATELET 663 279 - 016 K/uL    MPV 11.0 8.9 - 12.9 FL    NRBC 0.0 0  WBC    ABSOLUTE NRBC 0.00 0.00 - 0.01 K/uL    NEUTROPHILS 84 (H) 32 - 75 %    BAND NEUTROPHILS 1 %    LYMPHOCYTES 7 (L) 12 - 49 %    MONOCYTES 8 5 - 13 %    EOSINOPHILS 0 0 - 7 %    BASOPHILS 0 0 - 1 %    IMMATURE GRANULOCYTES 0 0.0 - 0.5 %    ABS. NEUTROPHILS 17.6 (H) 1.8 - 8.0 K/UL    ABS. LYMPHOCYTES 1.4 0.8 - 3.5 K/UL    ABS. MONOCYTES 1.6 (H) 0.0 - 1.0 K/UL    ABS. EOSINOPHILS 0.0 0.0 - 0.4 K/UL    ABS. BASOPHILS 0.0 0.0 - 0.1 K/UL    ABS. IMM.  GRANS. 0.0 0.00 - 0.04 K/UL    DF MANUAL      RBC COMMENTS NORMOCYTIC, NORMOCHROMIC     METABOLIC PANEL, COMPREHENSIVE Collection Time: 12/22/18 10:50 PM   Result Value Ref Range    Sodium 137 136 - 145 mmol/L    Potassium 3.7 3.5 - 5.1 mmol/L    Chloride 105 97 - 108 mmol/L    CO2 26 21 - 32 mmol/L    Anion gap 6 5 - 15 mmol/L    Glucose 99 65 - 100 mg/dL    BUN 8 6 - 20 MG/DL    Creatinine 0.63 0.55 - 1.02 MG/DL    BUN/Creatinine ratio 13 12 - 20      GFR est AA >60 >60 ml/min/1.73m2    GFR est non-AA >60 >60 ml/min/1.73m2    Calcium 8.2 (L) 8.5 - 10.1 MG/DL    Bilirubin, total 0.6 0.2 - 1.0 MG/DL    ALT (SGPT) 26 12 - 78 U/L    AST (SGOT) 17 15 - 37 U/L    Alk.  phosphatase 89 45 - 117 U/L    Protein, total 7.3 6.4 - 8.2 g/dL    Albumin 3.2 (L) 3.5 - 5.0 g/dL    Globulin 4.1 (H) 2.0 - 4.0 g/dL    A-G Ratio 0.8 (L) 1.1 - 2.2     TROPONIN I    Collection Time: 12/22/18 10:50 PM   Result Value Ref Range    Troponin-I, Qt. <0.05 <0.05 ng/mL   LIPASE    Collection Time: 12/22/18 10:50 PM   Result Value Ref Range    Lipase 66 (L) 73 - 393 U/L   TSH 3RD GENERATION    Collection Time: 12/22/18 10:50 PM   Result Value Ref Range    TSH 1.02 0.36 - 3.74 uIU/mL   HCG URINE, QL. - POC    Collection Time: 12/23/18 12:46 AM   Result Value Ref Range    Pregnancy test,urine (POC) NEGATIVE  NEG     TROPONIN I    Collection Time: 12/23/18  3:50 AM   Result Value Ref Range    Troponin-I, Qt. <0.05 <0.05 ng/mL

## 2018-12-24 LAB
ANION GAP SERPL CALC-SCNC: 7 MMOL/L (ref 5–15)
ATRIAL RATE: 80 BPM
BACTERIA SPEC CULT: NORMAL
BASOPHILS # BLD: 0.1 K/UL (ref 0–0.1)
BASOPHILS NFR BLD: 0 % (ref 0–1)
BUN SERPL-MCNC: 14 MG/DL (ref 6–20)
BUN/CREAT SERPL: 23 (ref 12–20)
CALCIUM SERPL-MCNC: 8.5 MG/DL (ref 8.5–10.1)
CALCULATED R AXIS, ECG10: 62 DEGREES
CALCULATED T AXIS, ECG11: 27 DEGREES
CC UR VC: NORMAL
CHLORIDE SERPL-SCNC: 106 MMOL/L (ref 97–108)
CO2 SERPL-SCNC: 24 MMOL/L (ref 21–32)
CREAT SERPL-MCNC: 0.61 MG/DL (ref 0.55–1.02)
DIAGNOSIS, 93000: NORMAL
DIFFERENTIAL METHOD BLD: ABNORMAL
EOSINOPHIL # BLD: 0.1 K/UL (ref 0–0.4)
EOSINOPHIL NFR BLD: 1 % (ref 0–7)
ERYTHROCYTE [DISTWIDTH] IN BLOOD BY AUTOMATED COUNT: 14.3 % (ref 11.5–14.5)
GLUCOSE SERPL-MCNC: 87 MG/DL (ref 65–100)
HCT VFR BLD AUTO: 35.4 % (ref 35–47)
HCYS SERPL-SCNC: 3.5 UMOL/L (ref 3.7–13.9)
HGB BLD-MCNC: 11.6 G/DL (ref 11.5–16)
IMM GRANULOCYTES # BLD: 0.1 K/UL (ref 0–0.04)
IMM GRANULOCYTES NFR BLD AUTO: 1 % (ref 0–0.5)
LYMPHOCYTES # BLD: 2.3 K/UL (ref 0.8–3.5)
LYMPHOCYTES NFR BLD: 13 % (ref 12–49)
MCH RBC QN AUTO: 32.4 PG (ref 26–34)
MCHC RBC AUTO-ENTMCNC: 32.8 G/DL (ref 30–36.5)
MCV RBC AUTO: 98.9 FL (ref 80–99)
MONOCYTES # BLD: 1.8 K/UL (ref 0–1)
MONOCYTES NFR BLD: 11 % (ref 5–13)
NEUTS SEG # BLD: 13 K/UL (ref 1.8–8)
NEUTS SEG NFR BLD: 75 % (ref 32–75)
NRBC # BLD: 0 K/UL (ref 0–0.01)
NRBC BLD-RTO: 0 PER 100 WBC
P-R INTERVAL, ECG05: 138 MS
PLATELET # BLD AUTO: 227 K/UL (ref 150–400)
PMV BLD AUTO: 11.7 FL (ref 8.9–12.9)
POTASSIUM SERPL-SCNC: 3.7 MMOL/L (ref 3.5–5.1)
PROCALCITONIN SERPL-MCNC: 0.05 NG/ML (ref 0–0.08)
Q-T INTERVAL, ECG07: 368 MS
QRS DURATION, ECG06: 88 MS
QTC CALCULATION (BEZET), ECG08: 424 MS
RBC # BLD AUTO: 3.58 M/UL (ref 3.8–5.2)
SERVICE CMNT-IMP: NORMAL
SODIUM SERPL-SCNC: 137 MMOL/L (ref 136–145)
VENTRICULAR RATE, ECG03: 80 BPM
WBC # BLD AUTO: 17.3 K/UL (ref 3.6–11)

## 2018-12-24 PROCEDURE — 74011250637 HC RX REV CODE- 250/637: Performed by: NURSE PRACTITIONER

## 2018-12-24 PROCEDURE — 74011250637 HC RX REV CODE- 250/637: Performed by: INTERNAL MEDICINE

## 2018-12-24 PROCEDURE — 77010033678 HC OXYGEN DAILY

## 2018-12-24 PROCEDURE — 36415 COLL VENOUS BLD VENIPUNCTURE: CPT

## 2018-12-24 PROCEDURE — 81241 F5 GENE: CPT

## 2018-12-24 PROCEDURE — 74011000250 HC RX REV CODE- 250: Performed by: INTERNAL MEDICINE

## 2018-12-24 PROCEDURE — 85303 CLOT INHIBIT PROT C ACTIVITY: CPT

## 2018-12-24 PROCEDURE — 85732 THROMBOPLASTIN TIME PARTIAL: CPT

## 2018-12-24 PROCEDURE — 81240 F2 GENE: CPT

## 2018-12-24 PROCEDURE — 85306 CLOT INHIBIT PROT S FREE: CPT

## 2018-12-24 PROCEDURE — 80048 BASIC METABOLIC PNL TOTAL CA: CPT

## 2018-12-24 PROCEDURE — 74011000250 HC RX REV CODE- 250: Performed by: NURSE PRACTITIONER

## 2018-12-24 PROCEDURE — 74011000258 HC RX REV CODE- 258: Performed by: INTERNAL MEDICINE

## 2018-12-24 PROCEDURE — 85025 COMPLETE CBC W/AUTO DIFF WBC: CPT

## 2018-12-24 PROCEDURE — 85598 HEXAGNAL PHOSPH PLTLT NEUTRL: CPT

## 2018-12-24 PROCEDURE — 94640 AIRWAY INHALATION TREATMENT: CPT

## 2018-12-24 PROCEDURE — 74011250636 HC RX REV CODE- 250/636: Performed by: INTERNAL MEDICINE

## 2018-12-24 PROCEDURE — 74011250636 HC RX REV CODE- 250/636: Performed by: NURSE PRACTITIONER

## 2018-12-24 PROCEDURE — 83090 ASSAY OF HOMOCYSTEINE: CPT

## 2018-12-24 PROCEDURE — 85613 RUSSELL VIPER VENOM DILUTED: CPT

## 2018-12-24 PROCEDURE — 86147 CARDIOLIPIN ANTIBODY EA IG: CPT

## 2018-12-24 PROCEDURE — 85300 ANTITHROMBIN III ACTIVITY: CPT

## 2018-12-24 PROCEDURE — 74011250636 HC RX REV CODE- 250/636: Performed by: HOSPITALIST

## 2018-12-24 PROCEDURE — 94760 N-INVAS EAR/PLS OXIMETRY 1: CPT

## 2018-12-24 PROCEDURE — 65660000000 HC RM CCU STEPDOWN

## 2018-12-24 RX ORDER — LIDOCAINE 4 G/100G
1 PATCH TOPICAL EVERY 24 HOURS
Status: DISCONTINUED | OUTPATIENT
Start: 2018-12-24 | End: 2018-12-26 | Stop reason: HOSPADM

## 2018-12-24 RX ORDER — MORPHINE SULFATE 2 MG/ML
2 INJECTION, SOLUTION INTRAMUSCULAR; INTRAVENOUS
Status: DISCONTINUED | OUTPATIENT
Start: 2018-12-24 | End: 2018-12-26 | Stop reason: HOSPADM

## 2018-12-24 RX ADMIN — IPRATROPIUM BROMIDE AND ALBUTEROL SULFATE 3 ML: .5; 3 SOLUTION RESPIRATORY (INHALATION) at 14:13

## 2018-12-24 RX ADMIN — OXYCODONE AND ACETAMINOPHEN 1 TABLET: 5; 325 TABLET ORAL at 10:41

## 2018-12-24 RX ADMIN — MORPHINE SULFATE 2 MG: 2 INJECTION, SOLUTION INTRAMUSCULAR; INTRAVENOUS at 13:14

## 2018-12-24 RX ADMIN — AZITHROMYCIN MONOHYDRATE 500 MG: 500 INJECTION, POWDER, LYOPHILIZED, FOR SOLUTION INTRAVENOUS at 04:18

## 2018-12-24 RX ADMIN — ENOXAPARIN SODIUM 90 MG: 100 INJECTION, SOLUTION INTRAVENOUS; SUBCUTANEOUS at 04:18

## 2018-12-24 RX ADMIN — MORPHINE SULFATE 1 MG: 2 INJECTION, SOLUTION INTRAMUSCULAR; INTRAVENOUS at 09:09

## 2018-12-24 RX ADMIN — OXYCODONE AND ACETAMINOPHEN 1 TABLET: 5; 325 TABLET ORAL at 04:18

## 2018-12-24 RX ADMIN — FLUTICASONE FUROATE AND VILANTEROL TRIFENATATE 1 PUFF: 100; 25 POWDER RESPIRATORY (INHALATION) at 09:11

## 2018-12-24 RX ADMIN — POLYETHYLENE GLYCOL 3350 17 G: 17 POWDER, FOR SOLUTION ORAL at 09:09

## 2018-12-24 RX ADMIN — IPRATROPIUM BROMIDE AND ALBUTEROL SULFATE 3 ML: .5; 3 SOLUTION RESPIRATORY (INHALATION) at 02:03

## 2018-12-24 RX ADMIN — KETOROLAC TROMETHAMINE 15 MG: 30 INJECTION, SOLUTION INTRAMUSCULAR at 00:07

## 2018-12-24 RX ADMIN — CEFTRIAXONE 1 G: 1 INJECTION, POWDER, FOR SOLUTION INTRAMUSCULAR; INTRAVENOUS at 04:18

## 2018-12-24 RX ADMIN — IPRATROPIUM BROMIDE AND ALBUTEROL SULFATE 3 ML: .5; 3 SOLUTION RESPIRATORY (INHALATION) at 07:41

## 2018-12-24 RX ADMIN — GUAIFENESIN 600 MG: 600 TABLET, EXTENDED RELEASE ORAL at 09:09

## 2018-12-24 RX ADMIN — IPRATROPIUM BROMIDE AND ALBUTEROL SULFATE 3 ML: .5; 3 SOLUTION RESPIRATORY (INHALATION) at 19:09

## 2018-12-24 RX ADMIN — APIXABAN 10 MG: 5 TABLET, FILM COATED ORAL at 17:26

## 2018-12-24 RX ADMIN — GUAIFENESIN 600 MG: 600 TABLET, EXTENDED RELEASE ORAL at 17:26

## 2018-12-24 NOTE — PROGRESS NOTES
Reason for Admission:   Pulmonary thromboembolism                   RRAT Score:    3                 Plan for utilizing home health:   No recommendations at this time                       Likelihood of Readmission:  Low                         Transition of Care Plan:  CM met with pt and pt's mother at bedside to introduce role, verify demographics, and discuss discharge planning. Pt lives in [place], [place] but is currently visiting her mother who lives in [facility]. Pt plans to return to her mother's home following discharge while she recovers from this hospitalization. It is recommended pt to be prescribed Eliquis. CM called [address] in [place], [place] ([phone]) to check pricing. Pharmacy requested CM fax prescription in order for a price check to be done. Need RX. CM will continue to follow-up with transitional care needs. Care Management Interventions  PCP Verified by CM: No(pt lives in [place])  Mode of Transport at Discharge:  Other (see comment)(mother)  Transition of Care Consult (CM Consult): Discharge Planning(initial eval)  MyChart Signup: No  Discharge Durable Medical Equipment: No  Physical Therapy Consult: No  Occupational Therapy Consult: No  Speech Therapy Consult: No  Current Support Network: Relative's Home  Confirm Follow Up Transport: Self  Plan discussed with Pt/Family/Caregiver: Yes(discussed with pt and mother at bedside)      JUDIT Fung, [facility]  Care Management  [phone]

## 2018-12-24 NOTE — PROGRESS NOTES
Pharmacy Automatic Direct Oral Anticoagulant Renal Dosing Protocol    Patient currently receiving Apixaban 10 mg bid for 7 days then 5 mg bid  with an indication of PE. Start date: 12/24    Major interacting medications: none significant    Platelet inhibitors (dose/frequency): n/a    Labs:  Recent Labs     12/24/18  0501 12/22/18  2250   CREA 0.61 0.63   HGB 11.6 12.9    213     Wt Readings from Last 1 Encounters:   12/22/18 86.4 kg (190 lb 7.6 oz)        Creatinine Clearance: 96 ml/min    Impression/Plan:   - Dx = PE  - Received Enoxaparin 80mg SC q12h x3 doses  - Change to Apixaban 10mg po BID (should be for 7 days; no stop date entered, then 5mg BID thereafter     Pharmacy will follow daily and adjust as appropriate. Thank you,  Dwayen Winkler, [facility]       Child Can Score calculator  [employer].       Apixaban   Dabigatran   Edoxaban   Rivaroxaban    [email or web address]. pdf

## 2018-12-24 NOTE — PROGRESS NOTES
Cardiopulmonary Care Interdisciplinary Rounds were held today to discuss patient's plan of care and outcomes. The following members were present: NP/Physician, Pharmacy, Nursing and Case Management.     Expected Length of Stay:  - - -    Plan of Care: Continue current treatment plan

## 2018-12-24 NOTE — ROUTINE PROCESS
Bedside report provided by Franklin Dorsey RN. SBAR report, Kardex, MAR, Cardiac Rhythm (NSR), and pt general condition reviewed.

## 2018-12-24 NOTE — PROGRESS NOTES
Hospitalist text/paged regarding pain unrelieved by pain medications administered relatively closely together. Pt also concerned about fever. Awaiting vital sign assessment. Awaiting callback.

## 2018-12-24 NOTE — PROGRESS NOTES
Will need RX for Eliquis in order to receive pricing info from pharmacy. Will fax to pharmacy (fx. [phone]) once RX is obtained. Needs follow-up.      JUDIT Young, [facility]  Care Management  [phone]

## 2018-12-24 NOTE — PROGRESS NOTES
Hospitalist Progress Note    NAME: Sarah Nicole   :  1975   MRN:  [id number]       Assessment / Plan:  Sepsis POA: fever, tachycardia, leukocytosis, due to PNA c/w ABX  Bilateral acute PE: change lovenox to Eliquis, Hematology help appreciated, F/U hypercoagulable panel. Duplex negative for DVT. F/U ECHO. Bilateral Pneumonia: c/w CTX/Z-max, bronchodilators, monitor. Obesity: BMI 30.74, counseled  Surrogate Decision Maker:mother   Baseline: independent   Code status: Full  Prophylaxis: Eliquis  Recommended Disposition: Home w/Family     Subjective:     Chief Complaint / Reason for Physician Visit  \"My chest hurts\". Discussed with RN events overnight. Review of Systems:  Symptom Y/N Comments  Symptom Y/N Comments   Fever/Chills    Chest Pain y pleuritic   Poor Appetite    Edema     Cough    Abdominal Pain     Sputum    Joint Pain     SOB/LARSEN y   Pruritis/Rash     Nausea/vomit    Tolerating PT/OT     Diarrhea    Tolerating Diet y    Constipation    Other       Could NOT obtain due to:      Objective:     VITALS:   Last 24hrs VS reviewed since prior progress note. Most recent are:  Patient Vitals for the past 24 hrs:   Temp Pulse Resp BP SpO2   18 1201 97.5 °F (36.4 °C) 97 20 129/69 95 %   18 0741 -- -- -- -- 96 %   18 0740 97.7 °F (36.5 °C) 73 18 117/61 95 %   18 0405 97.4 °F (36.3 °C) 77 20 122/64 94 %   18 0204 -- -- -- -- 96 %   18 2304 98.8 °F (37.1 °C) (!) 101 20 126/64 93 %   18 -- -- -- -- 94 %   18 1949 99.1 °F (37.3 °C) 83 20 119/61 93 %   18 1611 (!) 100.9 °F (38.3 °C) 97 18 119/65 98 %   18 1409 -- -- -- -- 94 %     No intake or output data in the 24 hours ending 18 1249     PHYSICAL EXAM:  General: WD, WN. Alert, cooperative, in pain   EENT:  EOMI. Anicteric sclerae.  MMM  Resp:  Coarse Bs, crackles in right side  CV:  Regular  rhythm,  No edema  GI:  Soft, Non distended, Non tender.  +Bowel sounds  Neurologic:  Alert and oriented X 3, normal speech,   Psych:   Good insight. Not anxious nor agitated  Skin:  No rashes. No jaundice    Reviewed most current lab test results and cultures  YES  Reviewed most current radiology test results   YES  Review and summation of old records today    NO  Reviewed patient's current orders and MAR    YES  PMH/SH reviewed - no change compared to H&P  ________________________________________________________________________  Care Plan discussed with:    Comments   Patient y    Family  y    RN y    Care Manager     Consultant                        Multidiciplinary team rounds were held today with , nursing, pharmacist and clinical coordinator. Patient's plan of care was discussed; medications were reviewed and discharge planning was addressed. ________________________________________________________________________  Total NON critical care TIME:  35   Minutes    Total CRITICAL CARE TIME Spent:   Minutes non procedure based      Comments   >50% of visit spent in counseling and coordination of care y    ________________________________________________________________________  Lissette Strauss MD     Procedures: see electronic medical records for all procedures/Xrays and details which were not copied into this note but were reviewed prior to creation of Plan. LABS:  I reviewed today's most current labs and imaging studies.   Pertinent labs include:  Recent Labs     12/24/18  0501 12/22/18  2250   WBC 17.3* 20.6*   HGB 11.6 12.9   HCT 35.4 38.0    213     Recent Labs     12/24/18  0501 12/22/18  2250    137   K 3.7 3.7    105   CO2 24 26   GLU 87 99   BUN 14 8   CREA 0.61 0.63   CA 8.5 8.2*   ALB  --  3.2*   TBILI  --  0.6   SGOT  --  17   ALT  --  26       Signed: Lissette Strauss MD

## 2018-12-24 NOTE — PROGRESS NOTES
Problem: Falls - Risk of  Goal: *Absence of Falls  Document Jack Fall Risk and appropriate interventions in the flowsheet. Outcome: Progressing Towards Goal  Fall Risk Interventions:            Medication Interventions: Teach patient to arise slowly    Elimination Interventions: Call light in reach, Toileting schedule/hourly rounds             Problem: Pain  Goal: *Control of Pain  Outcome: Progressing Towards Goal  Pt able to sleep comfortably after receiving IV Toradol. She did receive an additional dose of PO Percocet at approximately 0420 but report overall pain less intense. Problem: Breathing Pattern - Ineffective  Goal: *Absence of hypoxia  Outcome: Progressing Towards Goal  SaO2 >95%. Pt continues to do well on room air. Pt breathing is mild to moderately guarded 2/2 pain.

## 2018-12-24 NOTE — CONSULTS
[facility]  MR#: [id number]  : 1975  ACCOUNT #: [de-identified]   DATE OF SERVICE: 2018    REASON FOR CONSULTATION:  PE.    REFERRING PHYSICIAN:  Dr. Talbert Dance:  The patient is a 76-year-old white female, who came to the emergency room on  with chest pain and shortness of breath. She had been diagnosed with pneumonia about a week ago, she was on antibiotics. Her breathing and chest pain continued to worsen. She came to the emergency room. She had a CTA done of her chest that showed bilateral pulmonary emboli, right lower lobe consolidation with right pleural effusion, minimal airspace disease, left lower lobe. The patient was admitted to the hospital, started on Lovenox and we are asked to see her for further evaluation. She really had no precipitating factors and we are asked to see her for further evaluation. PAST MEDICAL HISTORY:  None. FAMILY HISTORY:  No history of any clots. No history of any cancers in first-degree relatives. No history of any miscarriages. SOCIAL HISTORY:  She does have probably about a 25-30 pack year history, but she says that she is going to stop smoking now. She occasionally drinks. REVIEW OF SYSTEMS:  Twelve-point systems done and negative except for as above. PHYSICAL EXAMINATION:  VITAL SIGNS:  Temperature 97.7, pulse 73, blood pressure 117/61, satting 96% on 1 liter nasal cannula. GENERAL:  Lying in bed in no acute distress. HEENT:  Normocephalic, atraumatic. Extraocular muscles are intact. Oropharynx clear, without lesion. NECK:  Supple. No cervical, supraclavicular, axillary lymphadenopathy appreciated. HEART:  Regular rate and rhythm. LUNGS:  Clear to auscultation bilaterally. ABDOMEN:  Normoactive bowel sounds, soft, nontender, nondistended. No hepatosplenomegaly. EXTREMITIES:  No clubbing, cyanosis or edema.   NEUROLOGIC: Nonfocal.    LABORATORY DATA:  White blood cell count 17.3, hemoglobin 11.6, platelets 707. Chemistry:  Sodium 137, BUN 14, creatinine 0.61, calcium 8.2, total bilirubin 0.6, ALT 26, AST 17, alkaline phosphatase 89. ASSESSMENT AND PLAN:  The patient is a 53-year-old white female who we are asked to see for bilateral pulmonary embolism. The patient had bilateral lower extremity Dopplers done that did not show any DVT. She is currently on Lovenox. I think it would be reasonable to switch her over to Eliquis if her insurance will pay for it. In terms of a hypercoagulable workup, I will go ahead and send one as this seems to be not precipitated by anything at this point. Please call tomorrow with any questions. If the patient is still here on Wednesday, we will follow up. If she is discharged, please have her follow up with us in the office. She is moving to [place] and so if she moves to [place], she should follow up with a hematologist there. She is being treated for possible pneumonia. She will need repeat scans in the future to make sure that that has improved, but at this point, would continue on anticoagulation. We will see what the hypercoagulable workup shows but she will need to be on anticoagulation for at least 6 months. Please call tomorrow for any questions. We will follow up on Wednesday if she is still here.       MD CALVIN De / [place]  D: 12/24/2018 10:37     T: 12/24/2018 10:50  JOB #: [id number]

## 2018-12-24 NOTE — PROGRESS NOTES
Order for Toradol 15 mg IV seen as entered by hospitalist and dose administered to patient. Pt's pain reduced to 5/10 prior to dose administration. Pt able to sleep now.

## 2018-12-25 LAB
ALBUMIN SERPL-MCNC: 2.5 G/DL (ref 3.5–5)
ALBUMIN/GLOB SERPL: 0.6 {RATIO} (ref 1.1–2.2)
ALP SERPL-CCNC: 87 U/L (ref 45–117)
ALT SERPL-CCNC: 24 U/L (ref 12–78)
ANION GAP SERPL CALC-SCNC: 7 MMOL/L (ref 5–15)
AST SERPL-CCNC: 20 U/L (ref 15–37)
BASOPHILS # BLD: 0 K/UL (ref 0–0.1)
BASOPHILS NFR BLD: 0 % (ref 0–1)
BILIRUB SERPL-MCNC: 0.6 MG/DL (ref 0.2–1)
BUN SERPL-MCNC: 9 MG/DL (ref 6–20)
BUN/CREAT SERPL: 12 (ref 12–20)
CALCIUM SERPL-MCNC: 8.5 MG/DL (ref 8.5–10.1)
CHLORIDE SERPL-SCNC: 102 MMOL/L (ref 97–108)
CO2 SERPL-SCNC: 26 MMOL/L (ref 21–32)
CREAT SERPL-MCNC: 0.73 MG/DL (ref 0.55–1.02)
DIFFERENTIAL METHOD BLD: ABNORMAL
EOSINOPHIL # BLD: 0.2 K/UL (ref 0–0.4)
EOSINOPHIL NFR BLD: 2 % (ref 0–7)
ERYTHROCYTE [DISTWIDTH] IN BLOOD BY AUTOMATED COUNT: 14.1 % (ref 11.5–14.5)
GLOBULIN SER CALC-MCNC: 4.4 G/DL (ref 2–4)
GLUCOSE SERPL-MCNC: 138 MG/DL (ref 65–100)
HCT VFR BLD AUTO: 33.6 % (ref 35–47)
HGB BLD-MCNC: 11.1 G/DL (ref 11.5–16)
IMM GRANULOCYTES # BLD: 0.1 K/UL (ref 0–0.04)
IMM GRANULOCYTES NFR BLD AUTO: 1 % (ref 0–0.5)
LYMPHOCYTES # BLD: 1.8 K/UL (ref 0.8–3.5)
LYMPHOCYTES NFR BLD: 13 % (ref 12–49)
MAGNESIUM SERPL-MCNC: 2.2 MG/DL (ref 1.6–2.4)
MCH RBC QN AUTO: 32.4 PG (ref 26–34)
MCHC RBC AUTO-ENTMCNC: 33 G/DL (ref 30–36.5)
MCV RBC AUTO: 98 FL (ref 80–99)
MONOCYTES # BLD: 1.3 K/UL (ref 0–1)
MONOCYTES NFR BLD: 10 % (ref 5–13)
NEUTS SEG # BLD: 9.9 K/UL (ref 1.8–8)
NEUTS SEG NFR BLD: 75 % (ref 32–75)
NRBC # BLD: 0 K/UL (ref 0–0.01)
NRBC BLD-RTO: 0 PER 100 WBC
PLATELET # BLD AUTO: 238 K/UL (ref 150–400)
PMV BLD AUTO: 11.1 FL (ref 8.9–12.9)
POTASSIUM SERPL-SCNC: 3.3 MMOL/L (ref 3.5–5.1)
PROT SERPL-MCNC: 6.9 G/DL (ref 6.4–8.2)
RBC # BLD AUTO: 3.43 M/UL (ref 3.8–5.2)
SODIUM SERPL-SCNC: 135 MMOL/L (ref 136–145)
WBC # BLD AUTO: 13.3 K/UL (ref 3.6–11)

## 2018-12-25 PROCEDURE — 74011250636 HC RX REV CODE- 250/636: Performed by: INTERNAL MEDICINE

## 2018-12-25 PROCEDURE — 94640 AIRWAY INHALATION TREATMENT: CPT

## 2018-12-25 PROCEDURE — 74011000250 HC RX REV CODE- 250: Performed by: NURSE PRACTITIONER

## 2018-12-25 PROCEDURE — 80053 COMPREHEN METABOLIC PANEL: CPT

## 2018-12-25 PROCEDURE — 83735 ASSAY OF MAGNESIUM: CPT

## 2018-12-25 PROCEDURE — 36415 COLL VENOUS BLD VENIPUNCTURE: CPT

## 2018-12-25 PROCEDURE — 85025 COMPLETE CBC W/AUTO DIFF WBC: CPT

## 2018-12-25 PROCEDURE — 65660000000 HC RM CCU STEPDOWN

## 2018-12-25 PROCEDURE — 74011000258 HC RX REV CODE- 258: Performed by: INTERNAL MEDICINE

## 2018-12-25 PROCEDURE — 94760 N-INVAS EAR/PLS OXIMETRY 1: CPT

## 2018-12-25 PROCEDURE — 74011000250 HC RX REV CODE- 250: Performed by: INTERNAL MEDICINE

## 2018-12-25 PROCEDURE — 74011250637 HC RX REV CODE- 250/637: Performed by: INTERNAL MEDICINE

## 2018-12-25 PROCEDURE — 74011250637 HC RX REV CODE- 250/637: Performed by: NURSE PRACTITIONER

## 2018-12-25 RX ORDER — ESCITALOPRAM OXALATE 10 MG/1
20 TABLET ORAL DAILY
Status: DISCONTINUED | OUTPATIENT
Start: 2018-12-25 | End: 2018-12-26 | Stop reason: HOSPADM

## 2018-12-25 RX ORDER — ARIPIPRAZOLE 5 MG/1
5 TABLET ORAL DAILY
COMMUNITY
End: 2022-08-03

## 2018-12-25 RX ORDER — POTASSIUM CHLORIDE 20 MEQ/1
20 TABLET, EXTENDED RELEASE ORAL
Status: COMPLETED | OUTPATIENT
Start: 2018-12-25 | End: 2018-12-25

## 2018-12-25 RX ORDER — BUPROPION HYDROCHLORIDE 150 MG/1
300 TABLET ORAL
Status: DISCONTINUED | OUTPATIENT
Start: 2018-12-25 | End: 2018-12-26 | Stop reason: HOSPADM

## 2018-12-25 RX ORDER — ESCITALOPRAM OXALATE 20 MG/1
20 TABLET ORAL DAILY
COMMUNITY
End: 2022-08-03

## 2018-12-25 RX ORDER — BUPROPION HYDROCHLORIDE 150 MG/1
300 TABLET, EXTENDED RELEASE ORAL DAILY
COMMUNITY

## 2018-12-25 RX ORDER — IPRATROPIUM BROMIDE AND ALBUTEROL SULFATE 2.5; .5 MG/3ML; MG/3ML
3 SOLUTION RESPIRATORY (INHALATION)
Status: DISCONTINUED | OUTPATIENT
Start: 2018-12-26 | End: 2018-12-26 | Stop reason: HOSPADM

## 2018-12-25 RX ORDER — ARIPIPRAZOLE 5 MG/1
5 TABLET ORAL DAILY
Status: DISCONTINUED | OUTPATIENT
Start: 2018-12-25 | End: 2018-12-26 | Stop reason: HOSPADM

## 2018-12-25 RX ADMIN — IPRATROPIUM BROMIDE AND ALBUTEROL SULFATE 3 ML: .5; 3 SOLUTION RESPIRATORY (INHALATION) at 09:00

## 2018-12-25 RX ADMIN — CEFTRIAXONE 1 G: 1 INJECTION, POWDER, FOR SOLUTION INTRAMUSCULAR; INTRAVENOUS at 04:23

## 2018-12-25 RX ADMIN — GUAIFENESIN 600 MG: 600 TABLET, EXTENDED RELEASE ORAL at 17:23

## 2018-12-25 RX ADMIN — MORPHINE SULFATE 2 MG: 2 INJECTION, SOLUTION INTRAMUSCULAR; INTRAVENOUS at 07:41

## 2018-12-25 RX ADMIN — POLYETHYLENE GLYCOL 3350 17 G: 17 POWDER, FOR SOLUTION ORAL at 08:16

## 2018-12-25 RX ADMIN — FLUTICASONE FUROATE AND VILANTEROL TRIFENATATE 1 PUFF: 100; 25 POWDER RESPIRATORY (INHALATION) at 08:19

## 2018-12-25 RX ADMIN — IPRATROPIUM BROMIDE AND ALBUTEROL SULFATE 3 ML: .5; 3 SOLUTION RESPIRATORY (INHALATION) at 19:10

## 2018-12-25 RX ADMIN — AZITHROMYCIN MONOHYDRATE 500 MG: 500 INJECTION, POWDER, LYOPHILIZED, FOR SOLUTION INTRAVENOUS at 04:23

## 2018-12-25 RX ADMIN — POTASSIUM CHLORIDE 20 MEQ: 20 TABLET, EXTENDED RELEASE ORAL at 07:42

## 2018-12-25 RX ADMIN — BUPROPION HYDROCHLORIDE 300 MG: 150 TABLET, FILM COATED, EXTENDED RELEASE ORAL at 10:53

## 2018-12-25 RX ADMIN — ESCITALOPRAM OXALATE 20 MG: 10 TABLET ORAL at 10:53

## 2018-12-25 RX ADMIN — IPRATROPIUM BROMIDE AND ALBUTEROL SULFATE 3 ML: .5; 3 SOLUTION RESPIRATORY (INHALATION) at 14:15

## 2018-12-25 RX ADMIN — APIXABAN 10 MG: 5 TABLET, FILM COATED ORAL at 17:23

## 2018-12-25 RX ADMIN — GUAIFENESIN 600 MG: 600 TABLET, EXTENDED RELEASE ORAL at 08:17

## 2018-12-25 RX ADMIN — IPRATROPIUM BROMIDE AND ALBUTEROL SULFATE 3 ML: .5; 3 SOLUTION RESPIRATORY (INHALATION) at 02:58

## 2018-12-25 RX ADMIN — APIXABAN 10 MG: 5 TABLET, FILM COATED ORAL at 08:17

## 2018-12-25 RX ADMIN — MORPHINE SULFATE 2 MG: 2 INJECTION, SOLUTION INTRAMUSCULAR; INTRAVENOUS at 12:31

## 2018-12-25 RX ADMIN — MORPHINE SULFATE 2 MG: 2 INJECTION, SOLUTION INTRAMUSCULAR; INTRAVENOUS at 21:55

## 2018-12-25 RX ADMIN — ARIPIPRAZOLE 5 MG: 5 TABLET ORAL at 12:25

## 2018-12-25 NOTE — PROGRESS NOTES
Spiritual Care Partner Volunteer visited patient in [facility][note 3] unit on December 25, 2018.     Documented by:  JOANA Wang, [facility], Chaplain OSMAR ASHRAF [facility] Paging Service  [phone]

## 2018-12-25 NOTE — PROGRESS NOTES
Bedside shift change report given to Haydee Negrete RN (oncoming nurse) by Zander Miranda RN (offgoing nurse). Report included the following information SBAR.     0820 Patient stated she left blood-tinged urine in toilet. Urine was assessed; no blood present, urine carol and clear. Patient also provided PTA medication information for lexapro, wellbutrin, and abilify. Information was added to the PTA med list.    Cheryl Cardoso - Dr. Elin Jameson notified about urine and medications. Given verbal orders to resume at home medications. 8530 - spoke with pharmacy regarding medication interaction between azithromycin and lexapro. There is risk of QTc prolongation; patient is on telemetry, will continue to monitor. Also wellbutrin dosage clarified. Patient states she is taking 300mg daily, pharmacy recommendation to put in order for wellbutrin xl. [phone] - Provided education handout on eliquis, and reinforced incentive spirometer use. Patient used return demonstration of the incentive spirometer.

## 2018-12-25 NOTE — PROGRESS NOTES
Problem: Falls - Risk of  Goal: *Absence of Falls  Document Jack Fall Risk and appropriate interventions in the flowsheet. Outcome: Progressing Towards Goal  Fall Risk Interventions:            Medication Interventions: Assess postural VS orthostatic hypotension, Evaluate medications/consider consulting pharmacy, Teach patient to arise slowly    Elimination Interventions: Call light in reach     Patient verbalized she will use call light before ambulating, gripper socks were provided. Patient was educated on side effects of eliquis and morphine, and the importance of fall prevention.

## 2018-12-25 NOTE — PROGRESS NOTES
Problem: Falls - Risk of  Goal: *Absence of Falls  Document Jack Fall Risk and appropriate interventions in the flowsheet. Outcome: Progressing Towards Goal  Fall Risk Interventions:            Medication Interventions: Evaluate medications/consider consulting pharmacy    Elimination Interventions: Call light in reach             Problem: Pain  Goal: *Control of Pain  Outcome: Progressing Towards Goal  No PRN pain medication administered overnight. Pt felt comfortable and breathing was no longer guarded.

## 2018-12-25 NOTE — PROGRESS NOTES
Bedside report provided by Patience Hughes RN. Pt OOB to chair. Improved comfort and breathing p adjustments to pain medications and p sitting up in chair. Pt looks more at ease.

## 2018-12-25 NOTE — PROGRESS NOTES
1530  Pt. Reported having blood inurine. She used toilet in bathroom and flushed before showing any staff. Put a hat in her toilet and asked her to use that and to call out for nurse next time she uses restroom so we could assess it.

## 2018-12-25 NOTE — PROGRESS NOTES
Problem: General Infection Care Plan (Adult and Pediatric)  Goal: Improvement in signs and symptoms of infection (pna)  Outcome: Progressing Towards Goal  Patient provided with hand sanitizing wipes at bedside, education provided on the importance of hand hygiene. Patient verbalized understanding and performed returned demonstration of proper use.

## 2018-12-25 NOTE — PROGRESS NOTES
Hospitalist Progress Note    NAME: Anne Hughes   :  1975   MRN:  [id number]       Assessment / Plan:  Sepsis POA: fever, tachycardia, leukocytosis, due to PNA c/w ABX, so far no fever, WBC trending down, tachycardia resolved  Bilateral acute PE: change lovenox to Eliquis (will require 6 months treatment), Hematology help appreciated, F/U hypercoagulable panel. Duplex negative for DVT. F/U ECHO. Bilateral Pneumonia: c/w CTX/Z-max, bronchodilators, monitor. Obesity: BMI 30.74, counseled  Hypokalemia: replace and monitor  Surrogate Decision Maker:mother   Baseline: independent   Code status: Full  Prophylaxis: Eliquis  Recommended Disposition: Home w/Family     Plan to D/c home tomorrow if stable and ECHO is OK     Subjective:     Chief Complaint / Reason for Physician Visit  \"I feel better\". Discussed with RN events overnight. Review of Systems:  Symptom Y/N Comments  Symptom Y/N Comments   Fever/Chills    Chest Pain y pleuritic   Poor Appetite    Edema     Cough    Abdominal Pain     Sputum    Joint Pain     SOB/LARSEN y   Pruritis/Rash     Nausea/vomit    Tolerating PT/OT     Diarrhea    Tolerating Diet y    Constipation    Other       Could NOT obtain due to:      Objective:     VITALS:   Last 24hrs VS reviewed since prior progress note.  Most recent are:  Patient Vitals for the past 24 hrs:   Temp Pulse Resp BP SpO2   18 0711 98.8 °F (37.1 °C) 78 20 117/67 97 %   18 0338 98.8 °F (37.1 °C) 83 20 125/63 93 %   18 0256 -- -- -- -- 93 %   18 2316 99.1 °F (37.3 °C) 99 20 113/59 93 %   18 1910 98.9 °F (37.2 °C) 100 20 126/66 97 %   18 1908 -- -- -- -- 97 %   18 1516 97.9 °F (36.6 °C) 98 20 122/62 92 %   18 1413 -- -- -- -- 95 %   18 1201 97.5 °F (36.4 °C) 97 20 129/69 95 %       Intake/Output Summary (Last 24 hours) at 2018 0851  Last data filed at 2018 0818  Gross per 24 hour   Intake 480 ml   Output 300 ml   Net 180 ml        PHYSICAL EXAM:  General: WD, WN. Alert, cooperative, in pain   EENT:  EOMI. Anicteric sclerae. MMM  Resp:  Coarse Bs, rhonchi  CV:  Regular  rhythm,  No edema  GI:  Soft, Non distended, Non tender.  +Bowel sounds  Neurologic:  Alert and oriented X 3, normal speech,   Psych:   Good insight. Not anxious nor agitated  Skin:  No rashes. No jaundice    Reviewed most current lab test results and cultures  YES  Reviewed most current radiology test results   YES  Review and summation of old records today    NO  Reviewed patient's current orders and MAR    YES  PMH/SH reviewed - no change compared to H&P  ________________________________________________________________________  Care Plan discussed with:    Comments   Patient y    Family  y    RN y    Care Manager     Consultant                        Multidiciplinary team rounds were held today with , nursing, pharmacist and clinical coordinator. Patient's plan of care was discussed; medications were reviewed and discharge planning was addressed. ________________________________________________________________________  Total NON critical care TIME:  25   Minutes    Total CRITICAL CARE TIME Spent:   Minutes non procedure based      Comments   >50% of visit spent in counseling and coordination of care y    ________________________________________________________________________  Dwaine Jerez MD     Procedures: see electronic medical records for all procedures/Xrays and details which were not copied into this note but were reviewed prior to creation of Plan. LABS:  I reviewed today's most current labs and imaging studies.   Pertinent labs include:  Recent Labs     12/25/18  0431 12/24/18  0501 12/22/18  2250   WBC 13.3* 17.3* 20.6*   HGB 11.1* 11.6 12.9   HCT 33.6* 35.4 38.0    227 213     Recent Labs     12/25/18  0431 12/24/18  0501 12/22/18  2250   * 137 137   K 3.3* 3.7 3.7    106 105   CO2 26 24 26   * 87 99   BUN 9 14 8   CREA 0. 73 0.61 0.63   CA 8.5 8.5 8.2*   MG 2.2  --   --    ALB 2.5*  --  3.2*   TBILI 0.6  --  0.6   SGOT 20  --  17   ALT 24  --  26       Signed: Radha Patiño MD

## 2018-12-25 NOTE — PROGRESS NOTES
Bedside shift change report given to MONTANA Grewal (oncoming nurse) by [facility], RN (offgoing nurse). Report included the following information SBAR.

## 2018-12-26 VITALS
OXYGEN SATURATION: 97 % | WEIGHT: 190.48 LBS | BODY MASS INDEX: 30.61 KG/M2 | HEIGHT: 66 IN | HEART RATE: 75 BPM | SYSTOLIC BLOOD PRESSURE: 128 MMHG | TEMPERATURE: 97.6 F | RESPIRATION RATE: 16 BRPM | DIASTOLIC BLOOD PRESSURE: 79 MMHG

## 2018-12-26 LAB
ALBUMIN SERPL-MCNC: 2.4 G/DL (ref 3.5–5)
ALBUMIN/GLOB SERPL: 0.5 {RATIO} (ref 1.1–2.2)
ALP SERPL-CCNC: 93 U/L (ref 45–117)
ALT SERPL-CCNC: 36 U/L (ref 12–78)
ANION GAP SERPL CALC-SCNC: 7 MMOL/L (ref 5–15)
AST SERPL-CCNC: 31 U/L (ref 15–37)
BASOPHILS # BLD: 0 K/UL (ref 0–0.1)
BASOPHILS NFR BLD: 0 % (ref 0–1)
BILIRUB SERPL-MCNC: 0.4 MG/DL (ref 0.2–1)
BUN SERPL-MCNC: 8 MG/DL (ref 6–20)
BUN/CREAT SERPL: 16 (ref 12–20)
CALCIUM SERPL-MCNC: 8.5 MG/DL (ref 8.5–10.1)
CHLORIDE SERPL-SCNC: 106 MMOL/L (ref 97–108)
CO2 SERPL-SCNC: 25 MMOL/L (ref 21–32)
CREAT SERPL-MCNC: 0.5 MG/DL (ref 0.55–1.02)
DIFFERENTIAL METHOD BLD: ABNORMAL
EOSINOPHIL # BLD: 0.3 K/UL (ref 0–0.4)
EOSINOPHIL NFR BLD: 2 % (ref 0–7)
ERYTHROCYTE [DISTWIDTH] IN BLOOD BY AUTOMATED COUNT: 14.2 % (ref 11.5–14.5)
GLOBULIN SER CALC-MCNC: 4.4 G/DL (ref 2–4)
GLUCOSE SERPL-MCNC: 110 MG/DL (ref 65–100)
HCT VFR BLD AUTO: 32.7 % (ref 35–47)
HGB BLD-MCNC: 10.9 G/DL (ref 11.5–16)
IMM GRANULOCYTES # BLD: 0.1 K/UL (ref 0–0.04)
IMM GRANULOCYTES NFR BLD AUTO: 1 % (ref 0–0.5)
LYMPHOCYTES # BLD: 1.7 K/UL (ref 0.8–3.5)
LYMPHOCYTES NFR BLD: 15 % (ref 12–49)
MAGNESIUM SERPL-MCNC: 2.5 MG/DL (ref 1.6–2.4)
MCH RBC QN AUTO: 32.4 PG (ref 26–34)
MCHC RBC AUTO-ENTMCNC: 33.3 G/DL (ref 30–36.5)
MCV RBC AUTO: 97.3 FL (ref 80–99)
MONOCYTES # BLD: 1.3 K/UL (ref 0–1)
MONOCYTES NFR BLD: 12 % (ref 5–13)
NEUTS SEG # BLD: 7.8 K/UL (ref 1.8–8)
NEUTS SEG NFR BLD: 70 % (ref 32–75)
NRBC # BLD: 0 K/UL (ref 0–0.01)
NRBC BLD-RTO: 0 PER 100 WBC
PLATELET # BLD AUTO: 273 K/UL (ref 150–400)
PMV BLD AUTO: 10.8 FL (ref 8.9–12.9)
POTASSIUM SERPL-SCNC: 4.2 MMOL/L (ref 3.5–5.1)
PROT SERPL-MCNC: 6.8 G/DL (ref 6.4–8.2)
RBC # BLD AUTO: 3.36 M/UL (ref 3.8–5.2)
SODIUM SERPL-SCNC: 138 MMOL/L (ref 136–145)
WBC # BLD AUTO: 11.2 K/UL (ref 3.6–11)

## 2018-12-26 PROCEDURE — 74011250636 HC RX REV CODE- 250/636: Performed by: INTERNAL MEDICINE

## 2018-12-26 PROCEDURE — 36415 COLL VENOUS BLD VENIPUNCTURE: CPT

## 2018-12-26 PROCEDURE — 93306 TTE W/DOPPLER COMPLETE: CPT

## 2018-12-26 PROCEDURE — 74011250637 HC RX REV CODE- 250/637: Performed by: NURSE PRACTITIONER

## 2018-12-26 PROCEDURE — 74011250637 HC RX REV CODE- 250/637: Performed by: INTERNAL MEDICINE

## 2018-12-26 PROCEDURE — 74011000250 HC RX REV CODE- 250: Performed by: NURSE PRACTITIONER

## 2018-12-26 PROCEDURE — 83735 ASSAY OF MAGNESIUM: CPT

## 2018-12-26 PROCEDURE — 94760 N-INVAS EAR/PLS OXIMETRY 1: CPT

## 2018-12-26 PROCEDURE — 74011000258 HC RX REV CODE- 258: Performed by: INTERNAL MEDICINE

## 2018-12-26 PROCEDURE — 85025 COMPLETE CBC W/AUTO DIFF WBC: CPT

## 2018-12-26 PROCEDURE — 80053 COMPREHEN METABOLIC PANEL: CPT

## 2018-12-26 RX ORDER — OXYCODONE AND ACETAMINOPHEN 5; 325 MG/1; MG/1
1 TABLET ORAL
Qty: 40 TAB | Refills: 0 | Status: SHIPPED | OUTPATIENT
Start: 2018-12-26 | End: 2022-08-03

## 2018-12-26 RX ADMIN — POLYETHYLENE GLYCOL 3350 17 G: 17 POWDER, FOR SOLUTION ORAL at 09:25

## 2018-12-26 RX ADMIN — APIXABAN 10 MG: 5 TABLET, FILM COATED ORAL at 09:25

## 2018-12-26 RX ADMIN — ARIPIPRAZOLE 5 MG: 5 TABLET ORAL at 09:24

## 2018-12-26 RX ADMIN — FLUTICASONE FUROATE AND VILANTEROL TRIFENATATE 1 PUFF: 100; 25 POWDER RESPIRATORY (INHALATION) at 09:26

## 2018-12-26 RX ADMIN — BUPROPION HYDROCHLORIDE 300 MG: 150 TABLET, FILM COATED, EXTENDED RELEASE ORAL at 09:25

## 2018-12-26 RX ADMIN — CEFTRIAXONE 1 G: 1 INJECTION, POWDER, FOR SOLUTION INTRAMUSCULAR; INTRAVENOUS at 04:13

## 2018-12-26 RX ADMIN — ESCITALOPRAM OXALATE 20 MG: 10 TABLET ORAL at 09:25

## 2018-12-26 RX ADMIN — GUAIFENESIN 600 MG: 600 TABLET, EXTENDED RELEASE ORAL at 09:25

## 2018-12-26 RX ADMIN — AZITHROMYCIN MONOHYDRATE 500 MG: 500 INJECTION, POWDER, LYOPHILIZED, FOR SOLUTION INTRAVENOUS at 04:59

## 2018-12-26 NOTE — PROGRESS NOTES
Home Oxygen Test  Date of test: 12/26/18  Time of test: 1013    Sa02 95 % on room air AT REST. Sa02 93 % on room air DURING AMBULATION. Sa02 93 % on 0 Liters DURING AMBULATION. Sa02 94 % on 0 Liters AT REST/AFTER AMBULATION.

## 2018-12-26 NOTE — DISCHARGE SUMMARY
Hospitalist Discharge Summary     Patient ID:  Sirisha Alan  [id number]  16 y.o.  1975    PCP on record: Other, MD Angie    Admit date: 12/22/2018  Discharge date and time: 12/26/2018      DISCHARGE DIAGNOSIS:    Bilateral Pulmonary embolism  Sepsis due to PNA  Hypokalemia  Obesity      CONSULTATIONS:  IP CONSULT TO HEMATOLOGY    Excerpted HPI from H&P of Mason Valadez MD:  CHIEF COMPLAINT: chest pain   39years old female from home with no significant past medical history presented to the hospital complaining from chest pain associated with the flank pain started yesterday associated with shortness of breath and generalized weakness, patient stated she was diagnosed with pneumonia about 1 week ago and she was discharged with oral antibiotic, according to the patient her symptom did not improve and her condition worsening, patient had CTA chest was done and show bilateral PE and right lower lobe consolidation with right pleural effusion and minimal airspace disease in the lower lobe, blood work was significant for elevated white blood cell count 20.6.    ______________________________________________________________________  DISCHARGE SUMMARY/HOSPITAL COURSE:  for full details see H&P, daily progress notes, labs, consult notes. CTA chest demonstrated Bilateral pulmonary emboli. Right lower lobe consolidation with right pleural effusion. Minimal airspace disease left lower lobe. Echo:  EF 55-60%, trivial MR, no AS    Patient was started on lovenox and transitioned to Eliquis. Continue for atleast 6 months. Hematology input appreciated. Hypercoag work up pending. Follow up with Dr Rola Mallory in 2 weeks - patient asked to call for an appointment. Doubt sepsis / pna as no productive cough, no fever and leukocytosis likely related to SIRS from PE. Completed 4-5 days of zithromax / rocephin anyway. Does not need oxygen. Sa02 95 % on room air AT REST.   Sa02 93 % on room air DURING AMBULATION. Sa02 93 % on 0 Liters DURING AMBULATION. Sa02 94 % on 0 Liters AT REST/AFTER AMBULATION. Blood culture 1/4 is contaminant. Clinically stable  _______________________________________________________________________  Patient seen and examined by me on discharge day. Pertinent Findings:  Gen:    Not in distress  Chest: Clear lungs  CVS:   Regular rhythm. No edema  Abd:  Soft, not distended, not tender  Neuro:  Alert, Oriented x 4, grossly non focal exam  _______________________________________________________________________  DISCHARGE MEDICATIONS:   Current Discharge Medication List      START taking these medications    Details   apixaban (ELIQUIS) 5 mg tablet Take 10 mg BID until Dec 30th then decrease to 5mg BID starting on Dec 31st  Qty: 60 Tab, Refills: 3      oxyCODONE-acetaminophen (PERCOCET) 5-325 mg per tablet Take 1 Tab by mouth every six (6) hours as needed. Max Daily Amount: 4 Tabs. Qty: 40 Tab, Refills: 0    Associated Diagnoses: Bilateral pulmonary embolism ([address]         CONTINUE these medications which have NOT CHANGED    Details   buPROPion SR (WELLBUTRIN SR) 150 mg SR tablet Take 300 mg by mouth daily. escitalopram oxalate (LEXAPRO) 20 mg tablet Take 20 mg by mouth daily. ARIPiprazole (ABILIFY) 5 mg tablet Take 5 mg by mouth daily. My Recommended Diet, Activity, Wound Care, and follow-up labs are listed in the patient's Discharge Insturctions which I have personally completed and reviewed.   Risk of deterioration: Low    Condition at Discharge:  Stable  _____________________________________________________________________    Disposition  Home with family, no needs  ____________________________________________________________________    Care Plan discussed with:   Patient, Family, RN, Care Manager    ____________________________________________________________________    Code Status: Full Code  ____________________________________________________________________      Condition at Discharge:  Stable  _____________________________________________________________________  Follow up with:   PCP : Angie Reid MD  Follow-up Information     Follow up With Specialties Details Why Contact Info    Teri Tucker MD Hematology and Oncology, Hematology, Oncology In 2 weeks  [address]  [address]  [facility]  [phone]                Total time in minutes spent coordinating this discharge (includes going over instructions, follow-up, prescriptions, and preparing report for sign off to her PCP) :  35 minutes    Signed:  Layne Pringle MD

## 2018-12-26 NOTE — PROGRESS NOTES
Bedside shift change report given to Kevyn Zaragoza, RN (oncoming nurse) by Shagufta Mohamud RN (offgoing nurse). Report included the following information SBAR.     1025 - O2 challenge performed, Dr. Nini Valadez notified. Patient's mother will provider transport home when she is ready.

## 2018-12-26 NOTE — PROGRESS NOTES
[address]            12/26/2018      RE: Eliceo Monteiro (YOB: 1975)    To Whom it May Concern: This is to certify that Eliceo Monteiro was admitted to [facility] from 12/22/2018 to 12/26/2018. She is advised to rest for a few days and may return to work on 01/02/2019 with no new restrictions. Please feel free to contact my office if you have any questions or concerns. Thank you for your assistance in this matter.         Faheem Do MD  [phone] office

## 2018-12-26 NOTE — DISCHARGE INSTRUCTIONS
HOSPITALIST DISCHARGE INSTRUCTIONS    NAME: Andrea Hdz   :  1975   MRN:  [id number]     Date/Time:  2018 8:05 AM    ADMIT DATE: 2018   DISCHARGE DATE: 2018         · It is important that you take the medication exactly as they are prescribed. · Keep your medication in the bottles provided by the pharmacist and keep a list of the medication names, dosages, and times to be taken in your wallet. · Do not take other medications without consulting your doctor. What to do at Home    Recommended diet:  Regular Diet    Recommended activity: Activity as tolerated      If you have questions regarding the hospital related prescriptions or hospital related issues please call [facility] at [phone]. You can always direct your questions to your primary care doctor if you are unable to reach your hospital physician; your PCP works as an extension of your hospital doctor just like your hospital doctor is an extension of your PCP for your time at the hospital [facility])    If you experience any of the following symptoms then please call your primary care physician or return to the emergency room if you cannot get hold of your doctor:    Fever, chills, nausea, vomiting, or persistent diarrhea  Worsening weakness or new problems with your speech or balance  Dark stools or visible blood in your stools  New Leg swelling or shortness of breath as these could be signs of a clot    Additional Instructions:      Bring these papers with you to your follow up appointments. The papers will help your doctors be sure to continue the care plan from the hospital.              Information obtained by :  I understand that if any problems occur once I am at home I am to contact my physician. I understand and acknowledge receipt of the instructions indicated above. Physician's or R.N.'s Signature                                                                  Date/Time                                                                                                                                              Patient or Representative Signature

## 2018-12-26 NOTE — PROGRESS NOTES
DISCHARGE SUMMARY FROM NURSE:    Patient is stable for discharge. I have reviewed the discharge instructions with the patient and verbalized understanding. All questions were fully answered and denies any complaints. There were no personal belongings, valuables or home medications left at patients bedside,  or safe. Hard scripts and medication handouts were given and reviewed with the patient. Appropriate educational materials and side effects teaching were provided. Cardiac monitor and IV line(s) removed.

## 2018-12-26 NOTE — PROGRESS NOTES
Per consult, CM talked with pt about d/c plan. Patient is expected to discharge today. Patient stated that her mother will transport pt to mothers home, where pt will spend the night. Patients mother will transport pt to [place] tomorrow and stay with pt until pt is feeling stronger. Patient needs ECHO prior to d/c    Care Management Interventions  PCP Verified by CM: No(pt lives in [place])  Mode of Transport at Discharge:  Other (see comment)(mother)  Transition of Care Consult (CM Consult): Discharge Planning(initial eval)  MyChart Signup: No  Discharge Durable Medical Equipment: No  Physical Therapy Consult: No  Occupational Therapy Consult: No  Speech Therapy Consult: No  Current Support Network: Relative's Home  Confirm Follow Up Transport: Self  Plan discussed with Pt/Family/Caregiver: Yes(discussed with pt and mother at bedside)  Discharge Location  Discharge Placement: Home with family assistance      Tiffanie Arana  Ext [phone]

## 2018-12-26 NOTE — PROGRESS NOTES
Received report from Nicole Chanel, [facility]. Assumed care of patient.

## 2018-12-27 LAB
AT III PPP CHRO-ACNC: 96 % (ref 75–135)
CARDIOLIPIN IGG SER IA-ACNC: <9 GPL U/ML (ref 0–14)
CARDIOLIPIN IGM SER IA-ACNC: 11 MPL U/ML (ref 0–12)
PROT C PPP-ACNC: 93 % (ref 73–180)
PROT S ACT/NOR PPP: 62 % (ref 63–140)

## 2018-12-28 LAB
DRVVT MIX, 117894: 49.8 SEC (ref 0–47)
DRVVT RATIO 500585: 1.4 RATIO (ref 0.8–1.2)
HEXAGONAL PHASE PHOSPHOLIPID, 117839: 21 SEC (ref 0–11)
INTERPRETATION, 117893: ABNORMAL
PTT-LA MIX, LUPR1T: 62.6 SEC (ref 0–48.9)
SCREEN APTT: 71 SEC (ref 0–51.9)
SCREEN DRVVT: 71.7 SEC (ref 0–47)

## 2018-12-30 LAB
BACTERIA SPEC CULT: ABNORMAL
BACTERIA SPEC CULT: ABNORMAL
SERVICE CMNT-IMP: ABNORMAL

## 2018-12-31 LAB
F2 GENE MUT ANL BLD/T: NORMAL
F5 GENE MUT ANL BLD/T: NORMAL

## 2019-12-09 ENCOUNTER — HOSPITAL ENCOUNTER (OUTPATIENT)
Dept: GENERAL RADIOLOGY | Age: 44
Discharge: HOME | End: 2019-12-09
Attending: INTERNAL MEDICINE
Payer: MEDICAID

## 2019-12-09 DIAGNOSIS — Z86.711 HISTORY OF PULMONARY EMBOLISM: ICD-10-CM

## 2019-12-09 PROCEDURE — 71046 X-RAY EXAM CHEST 2 VIEWS: CPT

## 2019-12-20 ENCOUNTER — HOSPITAL ENCOUNTER (OUTPATIENT)
Dept: CT IMAGING | Age: 44
Discharge: HOME | End: 2019-12-20
Attending: INTERNAL MEDICINE
Payer: MEDICAID

## 2019-12-20 DIAGNOSIS — R93.89 ABNORMAL CHEST CT: ICD-10-CM

## 2019-12-20 PROCEDURE — 71250 CT THORAX DX C-: CPT

## 2022-07-19 ENCOUNTER — APPOINTMENT (OUTPATIENT)
Dept: CT IMAGING | Age: 47
End: 2022-07-19
Attending: NURSE PRACTITIONER
Payer: MEDICAID

## 2022-07-19 ENCOUNTER — APPOINTMENT (OUTPATIENT)
Dept: VASCULAR SURGERY | Age: 47
End: 2022-07-19
Attending: NURSE PRACTITIONER
Payer: MEDICAID

## 2022-07-19 ENCOUNTER — HOSPITAL ENCOUNTER (EMERGENCY)
Age: 47
Discharge: HOME | End: 2022-07-19
Attending: EMERGENCY MEDICINE
Payer: MEDICAID

## 2022-07-19 VITALS
OXYGEN SATURATION: 97 % | BODY MASS INDEX: 32.96 KG/M2 | WEIGHT: 210 LBS | TEMPERATURE: 97.7 F | HEART RATE: 114 BPM | RESPIRATION RATE: 18 BRPM | DIASTOLIC BLOOD PRESSURE: 85 MMHG | HEIGHT: 67 IN | SYSTOLIC BLOOD PRESSURE: 121 MMHG

## 2022-07-19 DIAGNOSIS — I82.462 ACUTE DEEP VEIN THROMBOSIS (DVT) OF CALF MUSCLE VEIN OF LEFT LOWER EXTREMITY (HCC): Primary | ICD-10-CM

## 2022-07-19 DIAGNOSIS — I26.94 MULTIPLE SUBSEGMENTAL PULMONARY EMBOLI WITHOUT ACUTE COR PULMONALE (HCC): ICD-10-CM

## 2022-07-19 LAB
ANION GAP BLD CALC-SCNC: 9 MMOL/L (ref 10–20)
BASOPHILS # BLD: 0.1 K/UL (ref 0–0.1)
BASOPHILS NFR BLD: 1 % (ref 0–1)
BNP SERPL-MCNC: 41 PG/ML
CA-I BLD-MCNC: 1.09 MMOL/L (ref 1.12–1.32)
CHLORIDE BLD-SCNC: 114 MMOL/L (ref 98–107)
CO2 BLD-SCNC: 24.3 MMOL/L (ref 21–32)
CREAT BLD-MCNC: 0.76 MG/DL (ref 0.6–1.3)
DIFFERENTIAL METHOD BLD: ABNORMAL
EOSINOPHIL # BLD: 0.2 K/UL (ref 0–0.4)
EOSINOPHIL NFR BLD: 2 % (ref 0–7)
ERYTHROCYTE [DISTWIDTH] IN BLOOD BY AUTOMATED COUNT: 13.8 % (ref 11.5–14.5)
GLUCOSE BLD-MCNC: 136 MG/DL (ref 65–100)
HCG UR QL: NEGATIVE
HCT VFR BLD AUTO: 45 % (ref 35–47)
HGB BLD-MCNC: 15.6 G/DL (ref 11.5–16)
IMM GRANULOCYTES # BLD AUTO: 0.1 K/UL (ref 0–0.04)
IMM GRANULOCYTES NFR BLD AUTO: 1 % (ref 0–0.5)
LYMPHOCYTES # BLD: 2.1 K/UL (ref 0.8–3.5)
LYMPHOCYTES NFR BLD: 21 % (ref 12–49)
MCH RBC QN AUTO: 32.4 PG (ref 26–34)
MCHC RBC AUTO-ENTMCNC: 34.7 G/DL (ref 30–36.5)
MCV RBC AUTO: 93.4 FL (ref 80–99)
MONOCYTES # BLD: 0.7 K/UL (ref 0–1)
MONOCYTES NFR BLD: 7 % (ref 5–13)
NEUTS SEG # BLD: 7.2 K/UL (ref 1.8–8)
NEUTS SEG NFR BLD: 68 % (ref 32–75)
NRBC # BLD: 0 K/UL (ref 0–0.01)
NRBC BLD-RTO: 0 PER 100 WBC
PLATELET # BLD AUTO: 254 K/UL (ref 150–400)
PMV BLD AUTO: 10.8 FL (ref 8.9–12.9)
POTASSIUM BLD-SCNC: 3.9 MMOL/L (ref 3.5–5.1)
RBC # BLD AUTO: 4.82 M/UL (ref 3.8–5.2)
SERVICE CMNT-IMP: ABNORMAL
SODIUM BLD-SCNC: 146 MMOL/L (ref 136–145)
TROPONIN-HIGH SENSITIVITY: <4 NG/L (ref 0–51)
WBC # BLD AUTO: 10.3 K/UL (ref 3.6–11)

## 2022-07-19 PROCEDURE — 99285 EMERGENCY DEPT VISIT HI MDM: CPT

## 2022-07-19 PROCEDURE — 83880 ASSAY OF NATRIURETIC PEPTIDE: CPT

## 2022-07-19 PROCEDURE — 36415 COLL VENOUS BLD VENIPUNCTURE: CPT

## 2022-07-19 PROCEDURE — 74011000636 HC RX REV CODE- 636: Performed by: EMERGENCY MEDICINE

## 2022-07-19 PROCEDURE — 81025 URINE PREGNANCY TEST: CPT

## 2022-07-19 PROCEDURE — 80047 BASIC METABLC PNL IONIZED CA: CPT

## 2022-07-19 PROCEDURE — 93005 ELECTROCARDIOGRAM TRACING: CPT

## 2022-07-19 PROCEDURE — 85025 COMPLETE CBC W/AUTO DIFF WBC: CPT

## 2022-07-19 PROCEDURE — 71275 CT ANGIOGRAPHY CHEST: CPT

## 2022-07-19 PROCEDURE — 93971 EXTREMITY STUDY: CPT

## 2022-07-19 PROCEDURE — 74011250637 HC RX REV CODE- 250/637: Performed by: NURSE PRACTITIONER

## 2022-07-19 PROCEDURE — 84484 ASSAY OF TROPONIN QUANT: CPT

## 2022-07-19 RX ORDER — APIXABAN 5 MG (74)
KIT ORAL
Qty: 1 DOSE PACK | Refills: 0 | Status: SHIPPED | OUTPATIENT
Start: 2022-07-19 | End: 2022-08-03 | Stop reason: ALTCHOICE

## 2022-07-19 RX ADMIN — IOPAMIDOL 100 ML: 755 INJECTION, SOLUTION INTRAVENOUS at 15:24

## 2022-07-19 RX ADMIN — APIXABAN 10 MG: 5 TABLET, FILM COATED ORAL at 16:58

## 2022-07-19 NOTE — ED TRIAGE NOTES
Pt to ED for c/o L leg pain, swelling, CP and SOB since sunday. Hx of PE and no longer taking blood thinners.

## 2022-07-19 NOTE — DISCHARGE INSTRUCTIONS
Take the Eliquis exactly as prescribed and try to follow-up with hematology for further recommendations in regards to how long you need to be on the anticoagulant medication. As long as the medication is taken exactly as prescribed, it should be very effective. If experience any significant worsening chest pain or shortness of breath, return to the ED for evaluation.

## 2022-07-19 NOTE — ED PROVIDER NOTES
71-year-old female with past medical history of VTE (unprovoked) presents the ER today for evaluation of left lower extremity pain and shortness of breath. Patient states that she started to notice atraumatic left lower extremity pain, erythema, and warmth about 2 days ago, and shortly afterwards started to notice decreased exercise tolerance and dyspnea on exertion. She also endorses sharp pleuritic chest pain with deep inspiration. The collection of symptoms worried her for recurrent thromboembolism. She denies any recent surgical procedures, prolonged immobility, or fractures. She was treated with Eliquis after her first VTE, but is currently not on any anticoagulants. No past medical history on file. No past surgical history on file. No family history on file. Social History     Socioeconomic History    Marital status:      Spouse name: Not on file    Number of children: Not on file    Years of education: Not on file    Highest education level: Not on file   Occupational History    Not on file   Tobacco Use    Smoking status: Not on file    Smokeless tobacco: Not on file   Substance and Sexual Activity    Alcohol use: Not on file    Drug use: Not on file    Sexual activity: Not on file   Other Topics Concern    Not on file   Social History Narrative    Not on file     Social Determinants of Health     Financial Resource Strain:     Difficulty of Paying Living Expenses: Not on file   Food Insecurity:     Worried About Running Out of Food in the Last Year: Not on file    Martín of Food in the Last Year: Not on file   Transportation Needs:     Lack of Transportation (Medical): Not on file    Lack of Transportation (Non-Medical):  Not on file   Physical Activity:     Days of Exercise per Week: Not on file    Minutes of Exercise per Session: Not on file   Stress:     Feeling of Stress : Not on file   Social Connections:     Frequency of Communication with Friends and Family: Not on file    Frequency of Social Gatherings with Friends and Family: Not on file    Attends [employer] Services: Not on file    Active Member of Clubs or Organizations: Not on file    Attends Club or Organization Meetings: Not on file    Marital Status: Not on file   Intimate Partner Violence:     Fear of Current or Ex-Partner: Not on file    Emotionally Abused: Not on file    Physically Abused: Not on file    Sexually Abused: Not on file   Housing Stability:     Unable to Pay for Housing in the Last Year: Not on file    Number of [place] in the Last Year: Not on file    Unstable Housing in the Last Year: Not on file         ALLERGIES: Sulfa (sulfonamide antibiotics)    Review of Systems   Constitutional: Negative for fever. HENT: Negative for sore throat. Eyes: Negative for visual disturbance. Respiratory: Positive for shortness of breath. Negative for cough. Cardiovascular: Positive for chest pain and leg swelling. Gastrointestinal: Negative for vomiting. Genitourinary: Negative for dysuria. Musculoskeletal: Negative for myalgias. Skin: Negative for rash. Neurological: Negative for dizziness. Psychiatric/Behavioral: Negative for dysphoric mood. Vitals:    07/19/22 1309   BP: 135/82   Pulse: (!) 114   Resp: 18   Temp: 97.7 °F (36.5 °C)   SpO2: 99%   Weight: 95.3 kg (210 lb)   Height: 5' 7\" (1.702 m)            Physical Exam  Vitals and nursing note reviewed. Constitutional:       General: She is not in acute distress. Appearance: Normal appearance. She is not ill-appearing. HENT:      Head: Normocephalic and atraumatic. Right Ear: External ear normal.      Left Ear: External ear normal.      Nose: Nose normal.      Mouth/Throat:      Mouth: Mucous membranes are moist.      Pharynx: Oropharynx is clear. Eyes:      General: No scleral icterus. Extraocular Movements: Extraocular movements intact.       Conjunctiva/sclera: Conjunctivae normal.      Pupils: Pupils are equal, round, and reactive to light. Cardiovascular:      Rate and Rhythm: Regular rhythm. Tachycardia present. Pulses: Normal pulses. Radial pulses are 2+ on the right side and 2+ on the left side. Heart sounds: Normal heart sounds. No murmur heard. No gallop. Comments: There is some nonpitting edema to the left lower extremity with notable increased temperature and mild erythema  Pulmonary:      Effort: Tachypnea present. Breath sounds: Normal breath sounds and air entry. No wheezing, rhonchi or rales. Abdominal:      General: Abdomen is flat. Bowel sounds are normal.      Palpations: Abdomen is soft. Musculoskeletal:         General: Normal range of motion. Cervical back: Normal range of motion and neck supple. No rigidity. No muscular tenderness. Skin:     General: Skin is warm and dry. Coloration: Skin is not pale. Neurological:      General: No focal deficit present. Mental Status: She is alert and oriented to person, place, and time. Psychiatric:         Mood and Affect: Mood is anxious. Behavior: Behavior normal.         Thought Content: Thought content normal.         Judgment: Judgment normal.          [facility]  ED Course as of 07/19/22 1311   Tue Jul 19, 2022   1305 EKG 1255  Sinus tachycardia at 103 bpm.  Otherwise normal axis and intervals.   No STEMI [GG]      ED Course User Index  [GG] Dominguez Lozano DO     VITAL SIGNS:  Patient Vitals for the past 4 hrs:   Temp Pulse Resp BP SpO2   07/19/22 1447 -- -- -- -- 96 %   07/19/22 1309 97.7 °F (36.5 °C) (!) 114 18 135/82 99 %         LABS:  Recent Results (from the past 6 hour(s))   CBC WITH AUTOMATED DIFF    Collection Time: 07/19/22  1:15 PM   Result Value Ref Range    WBC 10.3 3.6 - 11.0 K/uL    RBC 4.82 3.80 - 5.20 M/uL    HGB 15.6 11.5 - 16.0 g/dL    HCT 45.0 35.0 - 47.0 %    MCV 93.4 80.0 - 99.0 FL    MCH 32.4 26.0 - 34.0 PG    MCHC 34.7 30.0 - 36.5 g/dL RDW 13.8 11.5 - 14.5 %    PLATELET 704 342 - 105 K/uL    MPV 10.8 8.9 - 12.9 FL    NRBC 0.0 0  WBC    ABSOLUTE NRBC 0.00 0.00 - 0.01 K/uL    NEUTROPHILS 68 32 - 75 %    LYMPHOCYTES 21 12 - 49 %    MONOCYTES 7 5 - 13 %    EOSINOPHILS 2 0 - 7 %    BASOPHILS 1 0 - 1 %    IMMATURE GRANULOCYTES 1 (H) 0.0 - 0.5 %    ABS. NEUTROPHILS 7.2 1.8 - 8.0 K/UL    ABS. LYMPHOCYTES 2.1 0.8 - 3.5 K/UL    ABS. MONOCYTES 0.7 0.0 - 1.0 K/UL    ABS. EOSINOPHILS 0.2 0.0 - 0.4 K/UL    ABS. BASOPHILS 0.1 0.0 - 0.1 K/UL    ABS. IMM. GRANS. 0.1 (H) 0.00 - 0.04 K/UL    DF AUTOMATED     TROPONIN-HIGH SENSITIVITY    Collection Time: 07/19/22  1:15 PM   Result Value Ref Range    Troponin-High Sensitivity <4 0 - 51 ng/L   NT-PRO BNP    Collection Time: 07/19/22  1:15 PM   Result Value Ref Range    NT pro-BNP 41 <125 PG/ML   POC CHEM8    Collection Time: 07/19/22  1:21 PM   Result Value Ref Range    Calcium, ionized (POC) 1.09 (L) 1.12 - 1.32 mmol/L    Sodium (POC) 146 (H) 136 - 145 mmol/L    Potassium (POC) 3.9 3.5 - 5.1 mmol/L    Chloride (POC) 114 (H) 98 - 107 mmol/L    CO2 (POC) 24.3 21 - 32 mmol/L    Anion gap (POC) 9 (L) 10 - 20 mmol/L    Glucose (POC) 136 (H) 65 - 100 mg/dL    Creatinine (POC) 0.76 0.6 - 1.3 mg/dL    GFRAA, POC >60 >60 ml/min/1.73m2    GFRNA, POC >60 >60 ml/min/1.73m2    Comment Comment Not Indicated. HCG URINE, QL. - POC    Collection Time: 07/19/22  1:38 PM   Result Value Ref Range    Pregnancy test,urine (POC) Negative NEG          IMAGING:  CTA CHEST W OR W WO CONT   Final Result   1. Acute filling defects in the right middle lobe are pulmonary artery, as well   as multiple segments of the right lower, right upper, and left lower lobes. There is a component of chronic thromboembolic disease as well, as some of these   defects are less well-defined and correlate with defects identified in December 2018. No evidence of right heart strain.  These findings were discussed with ZOE Quijano by Dr. James James at 7-19-22 3:59pm.   2. No acute airspace disease. DUPLEX LOWER EXT VENOUS LEFT    (Results Pending)         Medications During Visit:  Medications   apixaban (ELIQUIS) tablet 10 mg (has no administration in time range)   iopamidoL (ISOVUE-370) 76 % injection 100 mL (100 mL IntraVENous Given 7/19/22 6324)         DECISION MAKING:  Jennyfer Beaulieu is a 51 y.o. female who comes in as above. Work-up remarkable for acute DVT and several pulmonary emboli without evidence of heart strain, elevated BNP, or elevated troponin. Patient is hemodynamically stable and overall quite well-appearing. Her SPO2 is in the high 90s at rest and her heart rate at rest is about 79 to 80 bpm.    I discussed treatment options with her, and the patient greatly prefer to be discharged home back on Eliquis and follow-up with her hematology team as an outpatient. I thought that was reasonable, but I gave the patient thorough verbal + written return precautions for worsening symptoms or abnormal bleeding. The clinical decision making for this encounter included ordering and interpreting the above diagnostic tests with comparison to prior studies that are within our EMR. Past medical and surgical histories were reviewed, as were records from recent outpatient and emergency department visits. The above results discussed and reviewed with the patient. Patient verbalized understanding of the care plan, including any changes to current outpatient medication regimen, discussed disease process, symptom control, and follow-up care. Return precautions reviewed. IMPRESSION:  1. Acute deep vein thrombosis (DVT) of calf muscle vein of left lower extremity (HCC)    2.  Multiple subsegmental pulmonary emboli without acute cor pulmonale (HCC)        DISPOSITION:  Discharged      Current Discharge Medication List      START taking these medications    Details   apixaban (Eliquis DVT-PE Treat 30D Start) 5 mg (74 tabs) starter pack Take 10 mg (two 5 mg tablets) by mouth twice a day for 7 days   Followed by 5 mg (one 5 mg tablet) by mouth twice a day  Qty: 1 Dose Pack, Refills: 0  Start date: 7/19/2022              Follow-up Information     Follow up With Specialties Details Why Contact Info    Jaky Paula NP Nurse Practitioner Schedule an appointment as soon as possible for a visit  As needed [address] [facility] [phone]      Syl Patiño MD Hematology and Oncology   [address] [place] [address] [phone]              The patient is asked to follow-up with their primary care provider in the next several days. They are to call tomorrow for an appointment. The patient is asked to return promptly for any increased concerns or worsening of symptoms. They can return to this emergency department or any other emergency department.     Procedures

## 2022-07-20 ENCOUNTER — TELEPHONE (OUTPATIENT)
Dept: ONCOLOGY | Age: 47
End: 2022-07-20

## 2022-07-20 LAB
ATRIAL RATE: 103 BPM
CALCULATED P AXIS, ECG09: 45 DEGREES
CALCULATED R AXIS, ECG10: 66 DEGREES
CALCULATED T AXIS, ECG11: 56 DEGREES
DIAGNOSIS, 93000: NORMAL
P-R INTERVAL, ECG05: 130 MS
Q-T INTERVAL, ECG07: 338 MS
QRS DURATION, ECG06: 84 MS
QTC CALCULATION (BEZET), ECG08: 442 MS
VENTRICULAR RATE, ECG03: 103 BPM

## 2022-07-20 NOTE — TELEPHONE ENCOUNTER
Patient called and stated she was in the ER yesterday and she was diagnosed with DVT and PE. She stated her doctor discussed with Dr. Po Tamez and would like for her to be seen within the next week. She also stated they only gave her 1 week supply of eliquis. Please advise on where to schedule her.     # [phone]

## 2022-07-20 NOTE — ED NOTES
Eliquis starter pack phoned into [facility] at [facility] 96.. Manju Bucio NP

## 2022-08-02 NOTE — PROGRESS NOTES
[address] [facility] Oncology at [facility] [address]  [phone]    Hematology / Oncology New Patient Visit    Reason for Visit:   Kaylee Herring is a 51 y.o. female who is seen in consultation at the request of Nurse Practitioner Mitali Barba for evaluation of PE and DVT. History of Present Illness:   Kaylee Herring is a 51 y.o. female with medical history significant for bipolar depression, h/o PE  who is here for evaluation of multiple PEs and DVTs. Presented to the ER on 7/19/22 for evaluation of left lower extremity pain and shortness of breath. She noticed atraumatic left lower extremity pain, erythema, and warmth two days prior to ER visit, and shortly afterwards started to notice decreased exercise tolerance and dyspnea on exertion. She denies any recent surgical procedures, prolonged immobility, fractures, estrogen use, extended travel ( > 8 hours in car or plane), recent infections including COVID infection. She is vaccinated against COVID. Denies family h/o VTE, but she does not know her father's history. Her mother may have Sjorgen's. She does smoke cigarettes 1/2 ppd, but has recently cut back. Plans to go to hypnotist to try to quit. Reports prior history of unprovoked pulmonary embolism found December of 2018 at [facility].   Reports varicose veins present since she was a teenager. Denies SOB, CP. No leg pain or swelling. Currently taking eliquis 5mg twice daily without missed doses. No children. No past medical history on file. No past surgical history on file. Social History     Tobacco Use    Smoking status: Not on file    Smokeless tobacco: Not on file   Substance Use Topics    Alcohol use: Not on file      No family history on file.   Current Outpatient Medications   Medication Sig    apixaban (Eliquis DVT-PE Treat 30D Start) 5 mg (74 tabs) starter pack Take 10 mg (two 5 mg tablets) by mouth twice a day for 7 days   Followed by 5 mg (one 5 mg tablet) by mouth twice a day    oxyCODONE-acetaminophen (PERCOCET) 5-325 mg per tablet Take 1 Tab by mouth every six (6) hours as needed. Max Daily Amount: 4 Tabs. buPROPion SR (WELLBUTRIN SR) 150 mg SR tablet Take 300 mg by mouth daily. escitalopram oxalate (LEXAPRO) 20 mg tablet Take 20 mg by mouth daily. ARIPiprazole (ABILIFY) 5 mg tablet Take 5 mg by mouth daily. No current facility-administered medications for this visit. Allergies   Allergen Reactions    Sulfa (Sulfonamide Antibiotics) Other (comments)     Vomiting          Review of Systems: A complete review of systems was obtained, negative except as described above. Physical Exam:   Blood pressure 123/85, pulse (!) 105, temperature 97 °F (36.1 °C), temperature source Oral, resp. rate 18, height 5' 7\" (1.702 m), weight 212 lb (96.2 kg), SpO2 97 %. General: Well developed, no acute distress  Eyes: anicteric sclerae  HENT: Atraumatic, OP clear  Neck: Supple  Respiratory: CTAB, normal respiratory effort  CV: tachycardia, regular rhythm, no murmurs, no peripheral edema, scattered varicose veins seen on bilateral lower extremities, prominent vein palpated left inner leg. GI: Soft, nontender, nondistended  MS: Normal gait and station. Digits without clubbing or cyanosis. Skin: No rashes, ecchymoses, or petechiae. Normal temperature, turgor, and texture. Neuro/Psych: Alert, oriented. Moves 4 extremities. Appropriate affect, normal judgment/insight. Results:     Lab Results   Component Value Date/Time    WBC 10.3 07/19/2022 01:15 PM    HGB 15.6 07/19/2022 01:15 PM    HCT 45.0 07/19/2022 01:15 PM    PLATELET 763 86/96/0085 01:15 PM    MCV 93.4 07/19/2022 01:15 PM    ABS.  NEUTROPHILS 7.2 07/19/2022 01:15 PM     Lab Results   Component Value Date/Time    Sodium 138 12/26/2018 04:08 AM    Potassium 4.2 12/26/2018 04:08 AM    Chloride 106 12/26/2018 04:08 AM    CO2 25 12/26/2018 04:08 AM    Glucose 110 (H) 12/26/2018 04:08 AM    BUN 8 12/26/2018 04:08 AM Creatinine 0.50 (L) 12/26/2018 04:08 AM    GFR est AA >60 12/26/2018 04:08 AM    GFR est non-AA >60 12/26/2018 04:08 AM    Calcium 8.5 12/26/2018 04:08 AM    Sodium (POC) 146 (H) 07/19/2022 01:21 PM    Potassium (POC) 3.9 07/19/2022 01:21 PM    Chloride (POC) 114 (H) 07/19/2022 01:21 PM    Glucose (POC) 136 (H) 07/19/2022 01:21 PM    Creatinine (POC) 0.76 07/19/2022 01:21 PM    Calcium, ionized (POC) 1.09 (L) 07/19/2022 01:21 PM     Lab Results   Component Value Date/Time    Bilirubin, total 0.4 12/26/2018 04:08 AM    ALT (SGPT) 36 12/26/2018 04:08 AM    Alk. phosphatase 93 12/26/2018 04:08 AM    Protein, total 6.8 12/26/2018 04:08 AM    Albumin 2.4 (L) 12/26/2018 04:08 AM    Globulin 4.4 (H) 12/26/2018 04:08 AM     No results found for: IRON, FE, TIBC, IBCT, PSAT, FERR    No results found for: B12LT, FOL, RBCF  Lab Results   Component Value Date/Time    TSH 1.02 12/22/2018 10:50 PM     No results found for: HAMAT, HAAB, HABT, HAAT, HBSAG, HBSB, HBSAT, HBABN, HBCM, HBCAB, HBCAT, XBCABS, [address] [facility], [address] [place] [address], [facility], [id number], [address] [place] [address], [address] [place] [address], [id number], [id number], [address] [place] [address], [id number], [facility], [id number], HCGAT      Imaging:     Radiology report(s) reviewed. .  7/19/22 CTA of chest:  IMPRESSION  1. Acute filling defects in the right middle lobe are pulmonary artery, as well  as multiple segments of the right lower, right upper, and left lower lobes. There is a component of chronic thromboembolic disease as well, as some of these  defects are less well-defined and correlate with defects identified in December 2018. No evidence of right heart strain. 2. No acute airspace disease. 7/19/22 Bilateral lower extremity venous US:  Left lower extremity venous duplex positive for acute occlusive deep venous thrombosis of the gastrocnemius vein. There is a acute occlusive superficial thrombus of the left lower extremity greater saphenous calf vein. Incidental findings:  (1) Prominent groin lymph nodes bilaterally.   (2) There is evidence of reflux at the left lower extremity saphenofemoral junction. Secondary modality suggested for clinical correlation. Assessment & Plan:   Lulu Duggan is a 51 y.o. female here for evaluation of PE/DVT. 1. Acute DVT/PE: Discussed while cigarette smoking can increase her risk and likely contributed to the development of clots, the etiology of her thrombosis is unclear and she does not fit the diagnosis of a provoked clot. 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 -- Continue anticoagulation indefinitely. Sent #5 refills. -- Future refills advised with PCP. Advised CMP at least annually with PCP to monitor kidney function. --Follow up as needed prior to future invasive surgeries. 2. Hypercoagulable state:  Reasonable given young age of unprovoked clot. Discussed pros/cons of thrombophilia testing - results helpful when positive, but not helpful when negative, can guide testing of family members. -- Proceed with thrombophilia testing 10/2022 - call with results    3. Reflux at the left lower extremity saphenofemoral junction:   Incidental finding seen on recent US. Discussed this is a condition that can lead to varicose veins in legs and chronic venous insufficiency. Further discussed conservative approaches include use of support stockings, leg elevation and exercise. Reviewed a Vascular Surgeon can further evaluate, order a venous reflux ultrasound and discuss treatment techniques. She plans to try conservative management for now and will follow up with PCP for referral to Vascular Surgeon if symptoms worsen. 4. Smoking Cessation:   She is motivated to quit. Has cut down to 5 cigarettes per day. Plans to try hypnotherapy. Advised she try reaching for twizzlers instead of cigarettes as a distraction technique. 5. Bipolar Disorder:  Well managed on Aripiprazole and Bupropion.  Following closely with Psych NP. She asked if lithium interacts with eliquis. I advised no drug interactions seen on [employer]. Emotional well being: Pt is coping well with his/her disease and has excellent support. I appreciate the opportunity to participate in Ms. Paulette Andrea's care.     Signed By: Thom Harry NP     August 3, 2022

## 2022-08-03 ENCOUNTER — OFFICE VISIT (OUTPATIENT)
Dept: ONCOLOGY | Age: 47
End: 2022-08-03
Payer: COMMERCIAL

## 2022-08-03 VITALS
HEIGHT: 67 IN | TEMPERATURE: 97 F | RESPIRATION RATE: 18 BRPM | DIASTOLIC BLOOD PRESSURE: 85 MMHG | WEIGHT: 212 LBS | SYSTOLIC BLOOD PRESSURE: 123 MMHG | BODY MASS INDEX: 33.27 KG/M2 | HEART RATE: 105 BPM | OXYGEN SATURATION: 97 %

## 2022-08-03 DIAGNOSIS — Z71.6 ENCOUNTER FOR SMOKING CESSATION COUNSELING: ICD-10-CM

## 2022-08-03 DIAGNOSIS — I26.99 PE (PULMONARY THROMBOEMBOLISM) (HCC): ICD-10-CM

## 2022-08-03 DIAGNOSIS — I82.402 LEG DVT (DEEP VENOUS THROMBOEMBOLISM), ACUTE, LEFT (HCC): ICD-10-CM

## 2022-08-03 DIAGNOSIS — I87.2 SAPHENOFEMORAL VENOUS REFLUX: ICD-10-CM

## 2022-08-03 DIAGNOSIS — D68.59 HYPERCOAGULABLE STATE (HCC): Primary | ICD-10-CM

## 2022-08-03 PROCEDURE — 99213 OFFICE O/P EST LOW 20 MIN: CPT | Performed by: NURSE PRACTITIONER

## 2022-08-03 NOTE — PROGRESS NOTES
1. Have you been to the ER, urgent care clinic since your last visit? Hospitalized since your last visit? No    2. Have you seen or consulted any other health care providers outside of the [address] [place] [address] since your last visit? Include any pap smears or colon screening. No        Visit Vitals  /85 (BP 1 Location: Left upper arm, BP Patient Position: Sitting, BP Cuff Size: Adult)   Pulse (!) 105   Temp 97 °F (36.1 °C) (Oral)   Resp 18   Ht 5' 7\" (1.702 m)   Wt 212 lb (96.2 kg)   SpO2 97%   BMI 33.20 kg/m²            Chief Complaint   Patient presents with    Follow-up     Follow up visit.

## 2022-08-03 NOTE — LETTER
8/3/2022    Patient: Brook Herrmann   YOB: 1975   Date of Visit: 8/3/2022     Marta Willoughby NP  [address] [facility] [address]  Via Fax: [phone]    Dear Marta Willoughby NP,      Thank you for referring Ms. Nubia Stephenson to [facility] for evaluation. My notes for this consultation are attached. If you have questions, please do not hesitate to call me. I look forward to following your patient along with you.       Sincerely,    Melissa Gutierrez NP

## 2022-10-18 ENCOUNTER — TELEPHONE (OUTPATIENT)
Dept: ONCOLOGY | Age: 47
End: 2022-10-18

## 2022-10-18 LAB
AT III ACT/NOR PPP CHRO: 134 % (ref 75–135)
B2 GLYCOPROT1 IGA SER-ACNC: <9 GPI IGA UNITS (ref 0–25)
B2 GLYCOPROT1 IGG SER-ACNC: <9 GPI IGG UNITS (ref 0–20)
B2 GLYCOPROT1 IGM SER-ACNC: <9 GPI IGM UNITS (ref 0–32)
CARDIOLIPIN IGA SER IA-ACNC: <9 APL U/ML (ref 0–11)
CARDIOLIPIN IGG SER IA-ACNC: <9 GPL U/ML (ref 0–14)
CARDIOLIPIN IGM SER IA-ACNC: <9 MPL U/ML (ref 0–12)
DRVVT MIX, 117894: 46.6 SEC (ref 0–40.4)
DRVVT SCREEN TO CONFIRM RATIO: 1.1 RATIO (ref 0.8–1.2)
F2 C.20210G>A GENO BLD/T: NORMAL
F5 P.R506Q BLD/T QL: NORMAL
INTERPRETATION, 117893: ABNORMAL
PROT C ACT/NOR PPP: 161 % (ref 73–180)
PROT C AG ACT/NOR PPP IA: 123 % (ref 60–150)
PROT S ACT/NOR PPP: 83 % (ref 63–140)
SCREEN APTT: 34.5 SEC (ref 0–51.9)
SCREEN DRVVT: 54.1 SEC (ref 0–47)

## 2022-10-18 NOTE — TELEPHONE ENCOUNTER
10/18/22 5:14 PM Called pt and advised hypercoagulable workup is unrevealing. I advised she continue on anticoagulation indefinitely given 2nd unprovoked blood clot. She verbalized understanding.

## 2023-01-20 DIAGNOSIS — I26.99 PE (PULMONARY THROMBOEMBOLISM) (HCC): ICD-10-CM

## 2023-06-07 ENCOUNTER — TELEPHONE (OUTPATIENT)
Age: 48
End: 2023-06-07

## 2023-06-07 NOTE — TELEPHONE ENCOUNTER
Lvm to inform pt that her appt has been changed to a VV per the providers request and to cb if there are any questions.
intact

## 2023-06-27 ENCOUNTER — TELEPHONE (OUTPATIENT)
Age: 48
End: 2023-06-27

## 2023-06-27 ENCOUNTER — TELEMEDICINE (OUTPATIENT)
Age: 48
End: 2023-06-27
Payer: COMMERCIAL

## 2023-06-27 DIAGNOSIS — G47.419 NARCOLEPSY WITHOUT CATAPLEXY: Primary | ICD-10-CM

## 2023-06-27 DIAGNOSIS — F41.9 ANXIETY AND DEPRESSION: ICD-10-CM

## 2023-06-27 DIAGNOSIS — F32.A ANXIETY AND DEPRESSION: ICD-10-CM

## 2023-06-27 PROCEDURE — 99203 OFFICE O/P NEW LOW 30 MIN: CPT | Performed by: INTERNAL MEDICINE

## 2023-06-27 RX ORDER — SERTRALINE HYDROCHLORIDE 100 MG/1
100 TABLET, FILM COATED ORAL DAILY
COMMUNITY

## 2023-06-27 ASSESSMENT — SLEEP AND FATIGUE QUESTIONNAIRES
HOW LIKELY ARE YOU TO NOD OFF OR FALL ASLEEP WHILE SITTING AND TALKING TO SOMEONE: 0
DO YOU HAVE DIFFICULTY CONCENTRATING ON THE THINGS YOU DO BECAUSE YOU ARE SLEEPY OR TIRED: YES, EXTREME
DO YOU HAVE DIFFICULTY BEING AS ACTIVE AS YOU WANT TO BE IN THE MORNING BECAUSE YOU ARE SLEEPY OR TIRED: YES, EXTREME
HOW LIKELY ARE YOU TO NOD OFF OR FALL ASLEEP WHILE WATCHING TV: HIGH CHANCE OF DOZING
HOW LONG DO YOU NAP: 45 MINUTES TO 1 HOUR
DO YOU HAVE DIFFICULTY VISITING YOUR FAMILY OR FRIENDS IN THEIR HOME BECAUSE YOU BECOME SLEEPY OR TIRED: YES, MODERATE
FOSQ SCORE: 6.5
DO YOU HAVE DIFFICULTY BEING AS ACTIVE AS YOU WANT TO BE IN THE EVENING BECAUSE YOU ARE SLEEPY OR TIRED: YES, EXTREME
DO YOU HAVE DIFFICULTY WATCHING A MOVIE OR VIDEO BECAUSE YOU BECOME SLEEPY OR TIRED: YES, EXTREME
HOW LIKELY ARE YOU TO NOD OFF OR FALL ASLEEP WHILE SITTING AND READING: 3
DO YOU GENERALLY HAVE DIFFICULTY REMEMBERING THINGS BECAUSE YOU ARE SLEEPY OR TIRED: YES, EXTREME
SELECT ANY OF THE FOLLOWING BEHAVIORS OBSERVED WHILE YOU ARE ASLEEP: NONE OF THE ABOVE
AVERAGE NUMBER OF SLEEP HOURS: 9
HOW LIKELY ARE YOU TO NOD OFF OR FALL ASLEEP WHILE SITTING QUIETLY AFTER LUNCH WITHOUT ALCOHOL: 3
ESS TOTAL SCORE: 18
HOW LIKELY ARE YOU TO NOD OFF OR FALL ASLEEP IN A CAR, WHILE STOPPED FOR A FEW MINUTES IN TRAFFIC: MODERATE CHANCE OF DOZING
DO YOU HAVE PROBLEMS WITH FREQUENT AWAKENINGS AT NIGHT: NO
HOW MANY NAPS DO YOU TAKE PER WEEK: 7
HOW LIKELY ARE YOU TO NOD OFF OR FALL ASLEEP WHEN YOU ARE A PASSENGER IN A CAR FOR AN HOUR WITHOUT A BREAK: 2
DO YOU HAVE DIFFICULTY OPERATING A MOTOR VEHICLE FOR LONG DISTANCES (GREATER THAN 100 MILES) BECAUSE YOU BECOME SLEEPY: YES, MODERATE
ARE YOU BOTHERED BY WAKING UP TOO EARLY AND NOT BEING ABLE TO GET BACK TO SLEEP: NO
ARE YOU BOTHERED BY WAKING UP TOO EARLY AND NOT BEING ABLE TO GET BACK TO SLEEP: IS NOT
HOW LIKELY ARE YOU TO NOD OFF OR FALL ASLEEP IN A CAR, WHILE STOPPED FOR A FEW MINUTES IN TRAFFIC: 2
DO YOU TAKE NAPS: YES
HOW LIKELY ARE YOU TO NOD OFF OR FALL ASLEEP WHILE SITTING INACTIVE IN A PUBLIC PLACE: 2
HAS YOUR MOOD BEEN AFFECTED BECAUSE YOU ARE SLEEPY OR TIRED: YES, EXTREME
DO YOU GET TOO LITTLE SLEEP AT NIGHT: NO
HOW LIKELY ARE YOU TO NOD OFF OR FALL ASLEEP WHILE LYING DOWN TO REST IN THE AFTERNOON WHEN CIRCUMSTANCES PERMIT: 3
NUMBER OF TIMES YOU WAKE PER NIGHT: 0
HOW LIKELY ARE YOU TO NOD OFF OR FALL ASLEEP WHILE SITTING INACTIVE IN A PUBLIC PLACE: MODERATE CHANCE OF DOZING
DO YOU HAVE DIFFICULTY OPERATING A MOTOR VEHICLE FOR SHORT DISTANCES (LESS THAN 100 MILES) BECAUSE YOU BECOME SLEEPY: YES, MODERATE
DO YOU WORK SHIFTS: NO
DO YOU GET TOO LITTLE SLEEP AT NIGHT: DOES NOT
HOW LIKELY ARE YOU TO NOD OFF OR FALL ASLEEP WHILE WATCHING TV: 3
HOW LIKELY ARE YOU TO NOD OFF OR FALL ASLEEP WHEN YOU ARE A PASSENGER IN A CAR FOR AN HOUR WITHOUT A BREAK: MODERATE CHANCE OF DOZING
HOW LIKELY ARE YOU TO NOD OFF OR FALL ASLEEP WHILE SITTING AND READING: HIGH CHANCE OF DOZING
HOW LIKELY ARE YOU TO NOD OFF OR FALL ASLEEP WHILE SITTING AND TALKING TO SOMEONE: WOULD NEVER DOZE
HOW LIKELY ARE YOU TO NOD OFF OR FALL ASLEEP WHILE SITTING QUIETLY AFTER LUNCH WITHOUT ALCOHOL: HIGH CHANCE OF DOZING
AVERAGE NUMBER OF SLEEP HOURS: 9
HOW LIKELY ARE YOU TO NOD OFF OR FALL ASLEEP WHILE LYING DOWN TO REST IN THE AFTERNOON WHEN CIRCUMSTANCES PERMIT: HIGH CHANCE OF DOZING
WHAT TIME DO YOU USUALLY GO TO BED: 21:30
HAS YOUR RELATIONSHIP WITH FAMILY, FRIENDS OR WORK COLLEAGUES BEEN AFFECTED BECAUSE YOU ARE SLEEPY OR TIRED: YES, EXTREME

## 2023-07-20 ENCOUNTER — TELEPHONE (OUTPATIENT)
Age: 48
End: 2023-07-20

## 2023-07-24 ENCOUNTER — HOSPITAL ENCOUNTER (OUTPATIENT)
Facility: HOSPITAL | Age: 48
Discharge: HOME | End: 2023-07-27
Payer: COMMERCIAL

## 2023-07-24 DIAGNOSIS — G47.419 NARCOLEPSY WITHOUT CATAPLEXY: ICD-10-CM

## 2023-07-24 PROCEDURE — 95810 POLYSOM 6/> YRS 4/> PARAM: CPT | Performed by: INTERNAL MEDICINE

## 2023-07-25 ENCOUNTER — HOSPITAL ENCOUNTER (OUTPATIENT)
Facility: HOSPITAL | Age: 48
Discharge: HOME | End: 2023-07-28
Payer: COMMERCIAL

## 2023-07-25 VITALS
TEMPERATURE: 98.1 F | WEIGHT: 220 LBS | OXYGEN SATURATION: 98 % | HEIGHT: 67 IN | HEART RATE: 79 BPM | BODY MASS INDEX: 34.53 KG/M2

## 2023-07-25 PROCEDURE — 95805 MULTIPLE SLEEP LATENCY TEST: CPT | Performed by: INTERNAL MEDICINE

## 2023-07-25 NOTE — PROGRESS NOTES
[address] [facility].[place], [address]  Tel.  [phone]  Fax. [address] [facility], [address] [place] [address]  Tel.  [phone]  Fax. [phone] [facility], [address] [facility]  Tel.  [phone]  Fax. [phone]     Sleep Study Technical Notes  Disclaimer:  all notes have not been confirmed by interpreting physician      PRE-Test:  Yeimy Corral (: 1975) arrived in the lobby. Patient was greeted,screening questions asked. The patient was taken directly to their assigned room. Procedure was explained to the patient and questions were answered. Pt expressed understanding. Electrodes were applied without incident. The patient was placed in bed and the study was started. Acquisition Notes:  Lights off: 2247  Lights on: 0605  Respiratory events: YES  Other comments:   Patient to restroom- 1 time  Positional therapy used- None  Oral therapy device used- No  Study preformed supine- Yes  Ordered by the doctor- Yes  Caregiver present/needed- No  Watched tv/ used phone after lights out- No    POST Test:  Patient was awakened. Electrodes were removed. The patient was discharged after completing the Post Questionnaire. Patient stated that they were alert and ok to drive. Equipment and room cleaned per infection control policy.

## 2023-07-26 LAB
AMPHETAMINES UR QL SCN: NEGATIVE NG/ML
BARBITURATES UR QL: NEGATIVE NG/ML
BENZODIAZ UR QL SCN: NEGATIVE NG/ML
BZE UR QL: NEGATIVE NG/ML
CANNABINOIDS UR QL SCN: NEGATIVE NG/ML
CREAT UR-MCNC: 72.7 MG/DL (ref 20–300)
ETHANOL UR-MCNC: NEGATIVE %
MEPERIDINE UR QL: NEGATIVE NG/ML
METHADONE UR QL: NEGATIVE NG/ML
OPIATES UR QL SCN: NEGATIVE NG/ML
OXYCODONE+OXYMORPHONE UR QL SCN: NEGATIVE NG/ML
PCP UR QL: NEGATIVE NG/ML
PROPOXYPH UR QL: NEGATIVE NG/ML

## 2023-07-28 ENCOUNTER — TELEPHONE (OUTPATIENT)
Age: 48
End: 2023-07-28

## 2023-08-01 ASSESSMENT — SLEEP AND FATIGUE QUESTIONNAIRES
HOW LIKELY ARE YOU TO NOD OFF OR FALL ASLEEP WHILE WATCHING TV: 2
HOW LIKELY ARE YOU TO NOD OFF OR FALL ASLEEP WHILE SITTING AND TALKING TO SOMEONE: 0
DO YOU HAVE DIFFICULTY CONCENTRATING ON THE THINGS YOU DO BECAUSE YOU ARE SLEEPY OR TIRED: YES, EXTREME
ESS TOTAL SCORE: 12
DO YOU HAVE DIFFICULTY VISITING YOUR FAMILY OR FRIENDS IN THEIR HOME BECAUSE YOU BECOME SLEEPY OR TIRED: YES, A LITTLE
HOW LIKELY ARE YOU TO NOD OFF OR FALL ASLEEP WHILE SITTING QUIETLY AFTER LUNCH WITHOUT ALCOHOL: 1
HOW LIKELY ARE YOU TO NOD OFF OR FALL ASLEEP WHEN YOU ARE A PASSENGER IN A CAR FOR AN HOUR WITHOUT A BREAK: 1
HOW LIKELY ARE YOU TO NOD OFF OR FALL ASLEEP WHILE SITTING INACTIVE IN A PUBLIC PLACE: SLIGHT CHANCE OF DOZING
HOW LIKELY ARE YOU TO NOD OFF OR FALL ASLEEP IN A CAR, WHILE STOPPED FOR A FEW MINUTES IN TRAFFIC: SLIGHT CHANCE OF DOZING
HOW LIKELY ARE YOU TO NOD OFF OR FALL ASLEEP WHILE LYING DOWN TO REST IN THE AFTERNOON WHEN CIRCUMSTANCES PERMIT: HIGH CHANCE OF DOZING
HOW LIKELY ARE YOU TO NOD OFF OR FALL ASLEEP WHILE SITTING QUIETLY AFTER LUNCH WITHOUT ALCOHOL: SLIGHT CHANCE OF DOZING
DO YOU GENERALLY HAVE DIFFICULTY REMEMBERING THINGS BECAUSE YOU ARE SLEEPY OR TIRED: YES, MODERATE
DO YOU HAVE DIFFICULTY WATCHING A MOVIE OR VIDEO BECAUSE YOU BECOME SLEEPY OR TIRED: YES, MODERATE
FOSQ SCORE: 8
DO YOU HAVE DIFFICULTY OPERATING A MOTOR VEHICLE FOR SHORT DISTANCES (LESS THAN 100 MILES) BECAUSE YOU BECOME SLEEPY: YES, MODERATE
DO YOU HAVE DIFFICULTY BEING AS ACTIVE AS YOU WANT TO BE IN THE EVENING BECAUSE YOU ARE SLEEPY OR TIRED: YES, EXTREME
HOW LIKELY ARE YOU TO NOD OFF OR FALL ASLEEP WHILE LYING DOWN TO REST IN THE AFTERNOON WHEN CIRCUMSTANCES PERMIT: 3
HOW LIKELY ARE YOU TO NOD OFF OR FALL ASLEEP WHILE SITTING AND READING: HIGH CHANCE OF DOZING
HOW LIKELY ARE YOU TO NOD OFF OR FALL ASLEEP WHILE WATCHING TV: MODERATE CHANCE OF DOZING
DO YOU HAVE DIFFICULTY BEING AS ACTIVE AS YOU WANT TO BE IN THE MORNING BECAUSE YOU ARE SLEEPY OR TIRED: YES, EXTREME
HOW LIKELY ARE YOU TO NOD OFF OR FALL ASLEEP WHILE SITTING AND READING: 3
HOW LIKELY ARE YOU TO NOD OFF OR FALL ASLEEP WHEN YOU ARE A PASSENGER IN A CAR FOR AN HOUR WITHOUT A BREAK: SLIGHT CHANCE OF DOZING
HAS YOUR MOOD BEEN AFFECTED BECAUSE YOU ARE SLEEPY OR TIRED: YES, EXTREME
HOW LIKELY ARE YOU TO NOD OFF OR FALL ASLEEP WHILE SITTING AND TALKING TO SOMEONE: WOULD NEVER DOZE
HAS YOUR RELATIONSHIP WITH FAMILY, FRIENDS OR WORK COLLEAGUES BEEN AFFECTED BECAUSE YOU ARE SLEEPY OR TIRED: YES, EXTREME
DO YOU HAVE DIFFICULTY OPERATING A MOTOR VEHICLE FOR LONG DISTANCES (GREATER THAN 100 MILES) BECAUSE YOU BECOME SLEEPY: YES, MODERATE
HOW LIKELY ARE YOU TO NOD OFF OR FALL ASLEEP WHILE SITTING INACTIVE IN A PUBLIC PLACE: 1
HOW LIKELY ARE YOU TO NOD OFF OR FALL ASLEEP IN A CAR, WHILE STOPPED FOR A FEW MINUTES IN TRAFFIC: 1

## 2023-08-03 ENCOUNTER — OFFICE VISIT (OUTPATIENT)
Age: 48
End: 2023-08-03
Payer: COMMERCIAL

## 2023-08-03 VITALS
HEART RATE: 92 BPM | DIASTOLIC BLOOD PRESSURE: 90 MMHG | OXYGEN SATURATION: 98 % | TEMPERATURE: 98.3 F | HEIGHT: 67 IN | WEIGHT: 223.2 LBS | SYSTOLIC BLOOD PRESSURE: 128 MMHG | BODY MASS INDEX: 35.03 KG/M2

## 2023-08-03 DIAGNOSIS — F32.A ANXIETY AND DEPRESSION: ICD-10-CM

## 2023-08-03 DIAGNOSIS — G47.11 IDIOPATHIC HYPERSOMNIA: Primary | ICD-10-CM

## 2023-08-03 DIAGNOSIS — F41.9 ANXIETY AND DEPRESSION: ICD-10-CM

## 2023-08-03 PROCEDURE — 99214 OFFICE O/P EST MOD 30 MIN: CPT | Performed by: INTERNAL MEDICINE

## 2023-08-03 RX ORDER — ARMODAFINIL 250 MG/1
250 TABLET ORAL
Qty: 30 TABLET | Refills: 2 | Status: SHIPPED | OUTPATIENT
Start: 2023-08-03 | End: 2023-11-01

## 2023-08-03 NOTE — PROGRESS NOTES
[address] [facility], [address]  Tel.  [phone]    Fax. [address] [facility], [address] [place] [address]  Tel.  [phone]    Fax. [phone]     [facility], [address] [facility]  Tel.  [phone]    Fax. [address] [place], [address] (: 1975) is a 49 y.o. female, Established patient patient, seen for follow up and medication management, she was last seen by me on 23. SLEEP TESTING:    PSG(23): Apnea/Hypopnea index of 3.1 which indicates no significant sleep apnea. LLWO(): Severe daytime sleepiness as evidenced by an average latency of 2:10 minutes, Stage REM was not noted. Urine Drug Screen (23): Negative    ASSESSMENT/PLAN:   Diagnosis Orders   1. Idiopathic hypersomnia  Armodafinil 250 MG TABS      2. Anxiety and depression        3. BMI 34.0-34.9,adult            Patient has a history and examination consistent with the diagnosis of idiopathic hypersomia, her nocturnal polysomnogram is suggestive of sub-clinical depression. Return for for follow-up in 3 months or as needed. * Medication prescribed as below, if symptoms do not improve consider adding Xywav. Orders Placed This Encounter    Armodafinil 250 MG TABS     Sig: Take 250 mg by mouth daily (with breakfast) for 90 days. Max Daily Amount: 250 mg     Dispense:  30 tablet     Refill:  2        * She was educated on this medications including side effects and adverse reactions profile. * Counseling was provided regarding the importance of proper sleep hygiene (sleep duration of 8 to 9 hours), planned naps and use of wakefulness promoting agents. She agrees to track her sleep habits and daytime napping. * Discussed need for napping to prevent unintentional sleep attacks. * Patient was advised to exercise caution and vigilance while operating machinery and driving.     * Patient was asked to contact our office at any time

## 2023-08-07 ENCOUNTER — CLINICAL DOCUMENTATION (OUTPATIENT)
Age: 48
End: 2023-08-07

## 2023-08-28 ENCOUNTER — TELEPHONE (OUTPATIENT)
Age: 48
End: 2023-08-28

## 2023-08-28 DIAGNOSIS — G47.11 IDIOPATHIC HYPERSOMNIA: Primary | ICD-10-CM

## 2023-08-28 RX ORDER — METHYLPHENIDATE HYDROCHLORIDE 10 MG/1
10 TABLET ORAL 2 TIMES DAILY
Qty: 60 TABLET | Refills: 0 | Status: SHIPPED | OUTPATIENT
Start: 2023-09-27 | End: 2023-08-29 | Stop reason: DRUGHIGH

## 2023-08-28 RX ORDER — METHYLPHENIDATE HYDROCHLORIDE 10 MG/1
10 TABLET ORAL 2 TIMES DAILY
Qty: 60 TABLET | Refills: 0 | Status: SHIPPED | OUTPATIENT
Start: 2023-10-27 | End: 2023-08-29 | Stop reason: DRUGHIGH

## 2023-08-28 RX ORDER — METHYLPHENIDATE HYDROCHLORIDE 10 MG/1
10 TABLET ORAL 2 TIMES DAILY
Qty: 60 TABLET | Refills: 0 | Status: SHIPPED | OUTPATIENT
Start: 2023-08-28 | End: 2023-08-29 | Stop reason: DRUGHIGH

## 2023-08-28 NOTE — TELEPHONE ENCOUNTER
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 I have been taking 1/2 of the 250 mg Modifinil as you suggested if I experienced side effects. Even at half I seemed to still have dizziness and headache at approximately hour 5. My fatigue returned last week and I limped through Thursday and Friday. After work Friday I laid down and slept through the weekend, only getting up briefly once each day. 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 Wednesday I attempted to drive to the office and was very slow and dizzy. Feeling unsafe I returned home after about 10 minutes. My Psych NP agrees I need to wean off of the Setraline/Zoloft slowly. She is having me decrease by 25 mg, which I began today. I have my next follow up with her 9/18. I am just co concerned about what to expect and how to inform my employer. Is this just another sleep episode, which the modifinil is allowing me to be a little more awake? I would describe my constant state as twilight-like. Also, I'm wondering are these the side effects from the 125mg of modifinil, withdrawal from Setraline or a combination of all three? If you could give me some idea and guidance as far as time lines and expectations I would really appreciate it. I'm hoping I will be fit to drive and return to the office Monday, but if you have doubts I would like to let my director know something tomorrow, Friday. They are very supportive and insist I do what is needed to get this controlled, and not push too hard as is my habit.

## 2023-08-28 NOTE — TELEPHONE ENCOUNTER
Spoke with patient who is not taking Armodafinil 250 mg daily but remains sleepy, unable to drive or function well during the day. She is complaining of dizziness. Ritalin added for prn use, advised her to have the dizziness evaluated by her PCP. Orders Placed This Encounter    methylphenidate (RITALIN) 10 MG tablet     Sig: Take 1 tablet by mouth 2 times daily for 30 days. Max Daily Amount: 20 mg     Dispense:  60 tablet     Refill:  0    methylphenidate (RITALIN) 10 MG tablet     Sig: Take 1 tablet by mouth 2 times daily for 30 days. Max Daily Amount: 20 mg     Dispense:  60 tablet     Refill:  0    methylphenidate (RITALIN) 10 MG tablet     Sig: Take 1 tablet by mouth 2 times daily for 30 days.  Max Daily Amount: 20 mg     Dispense:  60 tablet     Refill:  0

## 2023-08-29 ENCOUNTER — TELEPHONE (OUTPATIENT)
Age: 48
End: 2023-08-29

## 2023-08-29 DIAGNOSIS — G47.11 IDIOPATHIC HYPERSOMNIA: Primary | ICD-10-CM

## 2023-08-29 RX ORDER — METHYLPHENIDATE HYDROCHLORIDE 10 MG/1
10 TABLET ORAL 3 TIMES DAILY
Qty: 90 TABLET | Refills: 0 | Status: SHIPPED | OUTPATIENT
Start: 2023-08-29 | End: 2023-09-28

## 2023-08-29 RX ORDER — METHYLPHENIDATE HYDROCHLORIDE 10 MG/1
10 TABLET ORAL 3 TIMES DAILY
Qty: 90 TABLET | Refills: 0 | Status: SHIPPED | OUTPATIENT
Start: 2023-10-28 | End: 2023-11-27

## 2023-08-29 RX ORDER — METHYLPHENIDATE HYDROCHLORIDE 10 MG/1
10 TABLET ORAL 3 TIMES DAILY
Qty: 90 TABLET | Refills: 0 | Status: SHIPPED | OUTPATIENT
Start: 2023-09-28 | End: 2023-10-28

## 2023-08-29 NOTE — TELEPHONE ENCOUNTER
Returned patient's call. The [facility] her medicine was sent to doesn't accept her insurance. Please send her ritalin prescription to [address] #[phone]. She has already contacted them. She would also like to know if you can prescribe her medicine to help with the dizziness, headaches and blurred vision you spoke to her about because her PCP will be out of town until next week.

## 2023-08-29 NOTE — TELEPHONE ENCOUNTER
Advised her to hold off on Armodafinil and start Ritalin as prescribed. Follow-up with PCP regarding headaches, dizziness and vision problems. Orders Placed This Encounter    methylphenidate (RITALIN) 10 MG tablet     Sig: Take 1 tablet by mouth 3 times daily for 30 days. Max Daily Amount: 30 mg     Dispense:  90 tablet     Refill:  0     DO NOT FILL BEFORE START DATE. methylphenidate (RITALIN) 10 MG tablet     Sig: Take 1 tablet by mouth 3 times daily for 30 days. Max Daily Amount: 30 mg     Dispense:  90 tablet     Refill:  0     DO NOT FILL BEFORE START DATE. methylphenidate (RITALIN) 10 MG tablet     Sig: Take 1 tablet by mouth 3 times daily for 30 days.  Max Daily Amount: 30 mg     Dispense:  90 tablet     Refill:  0

## 2023-09-06 ENCOUNTER — PATIENT MESSAGE (OUTPATIENT)
Age: 48
End: 2023-09-06

## 2023-09-06 NOTE — TELEPHONE ENCOUNTER
Patient left  at hospitals today requesting appointment for medication review. C/O extreme daytime fatigue; only awake 4 hours a day, unable to drive or return to work. She understands next plan of action included a follow up if medication didn't help to consider Xywav. Next available scheduled at [facility] for 10/16. Please advise.

## 2023-09-07 NOTE — TELEPHONE ENCOUNTER
LM for patient to call back to schedule. We can cancel appointments on schedule once we schedule sooner.